# Patient Record
Sex: FEMALE | Race: BLACK OR AFRICAN AMERICAN | NOT HISPANIC OR LATINO
[De-identification: names, ages, dates, MRNs, and addresses within clinical notes are randomized per-mention and may not be internally consistent; named-entity substitution may affect disease eponyms.]

---

## 2017-02-27 ENCOUNTER — RECORD ABSTRACTING (OUTPATIENT)
Age: 37
End: 2017-02-27

## 2017-02-27 DIAGNOSIS — Z87.09 PERSONAL HISTORY OF OTHER DISEASES OF THE RESPIRATORY SYSTEM: ICD-10-CM

## 2017-02-27 DIAGNOSIS — Z78.9 OTHER SPECIFIED HEALTH STATUS: ICD-10-CM

## 2017-02-27 DIAGNOSIS — Z82.49 FAMILY HISTORY OF ISCHEMIC HEART DISEASE AND OTHER DISEASES OF THE CIRCULATORY SYSTEM: ICD-10-CM

## 2017-02-27 DIAGNOSIS — G40.909 EPILEPSY, UNSPECIFIED, NOT INTRACTABLE, W/OUT STATUS EPILEPTICUS: ICD-10-CM

## 2017-02-27 DIAGNOSIS — Z98.890 OTHER SPECIFIED POSTPROCEDURAL STATES: ICD-10-CM

## 2017-02-27 DIAGNOSIS — O02.1 MISSED ABORTION: ICD-10-CM

## 2017-02-27 DIAGNOSIS — Z87.898 PERSONAL HISTORY OF OTHER SPECIFIED CONDITIONS: ICD-10-CM

## 2017-02-27 DIAGNOSIS — Z87.59 PERSONAL HISTORY OF OTHER COMPLICATIONS OF PREGNANCY, CHILDBIRTH AND THE PUERPERIUM: ICD-10-CM

## 2017-02-27 PROBLEM — Z00.00 ENCOUNTER FOR PREVENTIVE HEALTH EXAMINATION: Status: ACTIVE | Noted: 2017-02-27

## 2017-02-27 RX ORDER — ALBUTEROL 90 MCG
AEROSOL (GRAM) INHALATION
Refills: 0 | Status: ACTIVE | COMMUNITY

## 2018-07-23 PROBLEM — Z78.9 ALCOHOL USE: Status: ACTIVE | Noted: 2017-02-27

## 2019-03-12 ENCOUNTER — OUTPATIENT (OUTPATIENT)
Dept: OUTPATIENT SERVICES | Facility: HOSPITAL | Age: 39
LOS: 1 days | Discharge: HOME | End: 2019-03-12

## 2019-03-12 DIAGNOSIS — F33.1 MAJOR DEPRESSIVE DISORDER, RECURRENT, MODERATE: ICD-10-CM

## 2019-04-17 ENCOUNTER — EMERGENCY (EMERGENCY)
Facility: HOSPITAL | Age: 39
LOS: 0 days | Discharge: HOME | End: 2019-04-18
Attending: EMERGENCY MEDICINE | Admitting: EMERGENCY MEDICINE
Payer: MEDICAID

## 2019-04-17 VITALS
DIASTOLIC BLOOD PRESSURE: 57 MMHG | TEMPERATURE: 98 F | HEART RATE: 57 BPM | RESPIRATION RATE: 18 BRPM | OXYGEN SATURATION: 99 % | SYSTOLIC BLOOD PRESSURE: 128 MMHG

## 2019-04-17 DIAGNOSIS — J45.909 UNSPECIFIED ASTHMA, UNCOMPLICATED: ICD-10-CM

## 2019-04-17 DIAGNOSIS — R10.2 PELVIC AND PERINEAL PAIN: ICD-10-CM

## 2019-04-17 DIAGNOSIS — R06.2 WHEEZING: ICD-10-CM

## 2019-04-17 DIAGNOSIS — Z88.8 ALLERGY STATUS TO OTHER DRUGS, MEDICAMENTS AND BIOLOGICAL SUBSTANCES: ICD-10-CM

## 2019-04-17 LAB
ALBUMIN SERPL ELPH-MCNC: 4.3 G/DL — SIGNIFICANT CHANGE UP (ref 3.5–5.2)
ALP SERPL-CCNC: 72 U/L — SIGNIFICANT CHANGE UP (ref 30–115)
ALT FLD-CCNC: 10 U/L — SIGNIFICANT CHANGE UP (ref 0–41)
ANION GAP SERPL CALC-SCNC: 14 MMOL/L — SIGNIFICANT CHANGE UP (ref 7–14)
APPEARANCE UR: CLEAR — SIGNIFICANT CHANGE UP
AST SERPL-CCNC: 15 U/L — SIGNIFICANT CHANGE UP (ref 0–41)
BASOPHILS # BLD AUTO: 0.05 K/UL — SIGNIFICANT CHANGE UP (ref 0–0.2)
BASOPHILS NFR BLD AUTO: 0.6 % — SIGNIFICANT CHANGE UP (ref 0–1)
BILIRUB SERPL-MCNC: 0.2 MG/DL — SIGNIFICANT CHANGE UP (ref 0.2–1.2)
BILIRUB UR-MCNC: NEGATIVE — SIGNIFICANT CHANGE UP
BUN SERPL-MCNC: 11 MG/DL — SIGNIFICANT CHANGE UP (ref 10–20)
CALCIUM SERPL-MCNC: 9.5 MG/DL — SIGNIFICANT CHANGE UP (ref 8.5–10.1)
CHLORIDE SERPL-SCNC: 106 MMOL/L — SIGNIFICANT CHANGE UP (ref 98–110)
CO2 SERPL-SCNC: 25 MMOL/L — SIGNIFICANT CHANGE UP (ref 17–32)
COLOR SPEC: YELLOW — SIGNIFICANT CHANGE UP
CREAT SERPL-MCNC: 0.9 MG/DL — SIGNIFICANT CHANGE UP (ref 0.7–1.5)
DIFF PNL FLD: NEGATIVE — SIGNIFICANT CHANGE UP
EOSINOPHIL # BLD AUTO: 0.37 K/UL — SIGNIFICANT CHANGE UP (ref 0–0.7)
EOSINOPHIL NFR BLD AUTO: 4.4 % — SIGNIFICANT CHANGE UP (ref 0–8)
GLUCOSE SERPL-MCNC: 109 MG/DL — HIGH (ref 70–99)
GLUCOSE UR QL: NEGATIVE MG/DL — SIGNIFICANT CHANGE UP
HCG SERPL-ACNC: <0.6 MIU/ML — SIGNIFICANT CHANGE UP
HCT VFR BLD CALC: 36 % — LOW (ref 37–47)
HGB BLD-MCNC: 10.6 G/DL — LOW (ref 12–16)
IMM GRANULOCYTES NFR BLD AUTO: 0.2 % — SIGNIFICANT CHANGE UP (ref 0.1–0.3)
KETONES UR-MCNC: NEGATIVE — SIGNIFICANT CHANGE UP
LACTATE SERPL-SCNC: 2.1 MMOL/L — SIGNIFICANT CHANGE UP (ref 0.5–2.2)
LEUKOCYTE ESTERASE UR-ACNC: NEGATIVE — SIGNIFICANT CHANGE UP
LIDOCAIN IGE QN: 40 U/L — SIGNIFICANT CHANGE UP (ref 7–60)
LYMPHOCYTES # BLD AUTO: 2.13 K/UL — SIGNIFICANT CHANGE UP (ref 1.2–3.4)
LYMPHOCYTES # BLD AUTO: 25.1 % — SIGNIFICANT CHANGE UP (ref 20.5–51.1)
MCHC RBC-ENTMCNC: 25.8 PG — LOW (ref 27–31)
MCHC RBC-ENTMCNC: 29.4 G/DL — LOW (ref 32–37)
MCV RBC AUTO: 87.6 FL — SIGNIFICANT CHANGE UP (ref 81–99)
MONOCYTES # BLD AUTO: 0.63 K/UL — HIGH (ref 0.1–0.6)
MONOCYTES NFR BLD AUTO: 7.4 % — SIGNIFICANT CHANGE UP (ref 1.7–9.3)
NEUTROPHILS # BLD AUTO: 5.27 K/UL — SIGNIFICANT CHANGE UP (ref 1.4–6.5)
NEUTROPHILS NFR BLD AUTO: 62.3 % — SIGNIFICANT CHANGE UP (ref 42.2–75.2)
NITRITE UR-MCNC: NEGATIVE — SIGNIFICANT CHANGE UP
NRBC # BLD: 0 /100 WBCS — SIGNIFICANT CHANGE UP (ref 0–0)
PH UR: 7.5 — SIGNIFICANT CHANGE UP (ref 5–8)
PLATELET # BLD AUTO: 326 K/UL — SIGNIFICANT CHANGE UP (ref 130–400)
POTASSIUM SERPL-MCNC: 4.2 MMOL/L — SIGNIFICANT CHANGE UP (ref 3.5–5)
POTASSIUM SERPL-SCNC: 4.2 MMOL/L — SIGNIFICANT CHANGE UP (ref 3.5–5)
PROT SERPL-MCNC: 6.9 G/DL — SIGNIFICANT CHANGE UP (ref 6–8)
PROT UR-MCNC: NEGATIVE MG/DL — SIGNIFICANT CHANGE UP
RBC # BLD: 4.11 M/UL — LOW (ref 4.2–5.4)
RBC # FLD: 14.5 % — SIGNIFICANT CHANGE UP (ref 11.5–14.5)
SODIUM SERPL-SCNC: 145 MMOL/L — SIGNIFICANT CHANGE UP (ref 135–146)
SP GR SPEC: 1.02 — SIGNIFICANT CHANGE UP (ref 1.01–1.03)
UROBILINOGEN FLD QL: 1 MG/DL (ref 0.2–0.2)
WBC # BLD: 8.47 K/UL — SIGNIFICANT CHANGE UP (ref 4.8–10.8)
WBC # FLD AUTO: 8.47 K/UL — SIGNIFICANT CHANGE UP (ref 4.8–10.8)

## 2019-04-17 PROCEDURE — 99285 EMERGENCY DEPT VISIT HI MDM: CPT

## 2019-04-17 PROCEDURE — 71046 X-RAY EXAM CHEST 2 VIEWS: CPT | Mod: 26

## 2019-04-17 RX ORDER — IPRATROPIUM/ALBUTEROL SULFATE 18-103MCG
3 AEROSOL WITH ADAPTER (GRAM) INHALATION ONCE
Qty: 0 | Refills: 0 | Status: COMPLETED | OUTPATIENT
Start: 2019-04-17 | End: 2019-04-17

## 2019-04-17 RX ORDER — SODIUM CHLORIDE 9 MG/ML
1000 INJECTION INTRAMUSCULAR; INTRAVENOUS; SUBCUTANEOUS ONCE
Qty: 0 | Refills: 0 | Status: COMPLETED | OUTPATIENT
Start: 2019-04-17 | End: 2019-04-17

## 2019-04-17 RX ADMIN — SODIUM CHLORIDE 1000 MILLILITER(S): 9 INJECTION INTRAMUSCULAR; INTRAVENOUS; SUBCUTANEOUS at 23:20

## 2019-04-17 RX ADMIN — Medication 125 MILLIGRAM(S): at 22:20

## 2019-04-17 RX ADMIN — ONDANSETRON 4 MILLIGRAM(S): 8 TABLET, FILM COATED ORAL at 23:30

## 2019-04-17 RX ADMIN — Medication 3 MILLILITER(S): at 22:20

## 2019-04-17 RX ADMIN — SODIUM CHLORIDE 2000 MILLILITER(S): 9 INJECTION INTRAMUSCULAR; INTRAVENOUS; SUBCUTANEOUS at 22:20

## 2019-04-18 VITALS
HEART RATE: 73 BPM | OXYGEN SATURATION: 96 % | RESPIRATION RATE: 18 BRPM | TEMPERATURE: 98 F | DIASTOLIC BLOOD PRESSURE: 77 MMHG | SYSTOLIC BLOOD PRESSURE: 161 MMHG

## 2019-04-18 LAB — D DIMER BLD IA.RAPID-MCNC: 243 NG/ML DDU — HIGH (ref 0–230)

## 2019-04-18 PROCEDURE — 71275 CT ANGIOGRAPHY CHEST: CPT | Mod: 26

## 2019-04-18 PROCEDURE — 76830 TRANSVAGINAL US NON-OB: CPT | Mod: 26

## 2019-04-18 RX ORDER — ACETAMINOPHEN 500 MG
650 TABLET ORAL ONCE
Qty: 0 | Refills: 0 | Status: COMPLETED | OUTPATIENT
Start: 2019-04-18 | End: 2019-04-18

## 2019-04-18 RX ORDER — ALBUTEROL 90 UG/1
3 AEROSOL, METERED ORAL
Qty: 60 | Refills: 0
Start: 2019-04-18 | End: 2019-05-02

## 2019-04-18 RX ORDER — ALBUTEROL 90 UG/1
1 AEROSOL, METERED ORAL ONCE
Qty: 0 | Refills: 0 | Status: COMPLETED | OUTPATIENT
Start: 2019-04-18 | End: 2019-04-18

## 2019-04-18 RX ORDER — ONDANSETRON 8 MG/1
4 TABLET, FILM COATED ORAL ONCE
Qty: 0 | Refills: 0 | Status: COMPLETED | OUTPATIENT
Start: 2019-04-18 | End: 2019-04-17

## 2019-04-18 RX ORDER — ALBUTEROL 90 UG/1
2.5 AEROSOL, METERED ORAL
Qty: 0 | Refills: 0 | Status: COMPLETED | OUTPATIENT
Start: 2019-04-18 | End: 2019-04-18

## 2019-04-18 RX ORDER — SODIUM CHLORIDE 9 MG/ML
1000 INJECTION, SOLUTION INTRAVENOUS ONCE
Qty: 0 | Refills: 0 | Status: COMPLETED | OUTPATIENT
Start: 2019-04-18 | End: 2019-04-18

## 2019-04-18 RX ADMIN — ALBUTEROL 1 PUFF(S): 90 AEROSOL, METERED ORAL at 05:39

## 2019-04-18 RX ADMIN — SODIUM CHLORIDE 1000 MILLILITER(S): 9 INJECTION, SOLUTION INTRAVENOUS at 00:54

## 2019-04-18 RX ADMIN — ALBUTEROL 2.5 MILLIGRAM(S): 90 AEROSOL, METERED ORAL at 00:55

## 2019-04-18 RX ADMIN — ALBUTEROL 2.5 MILLIGRAM(S): 90 AEROSOL, METERED ORAL at 01:09

## 2019-04-18 RX ADMIN — SODIUM CHLORIDE 1000 MILLILITER(S): 9 INJECTION, SOLUTION INTRAVENOUS at 01:54

## 2019-04-18 RX ADMIN — ALBUTEROL 2.5 MILLIGRAM(S): 90 AEROSOL, METERED ORAL at 00:54

## 2019-04-18 RX ADMIN — Medication 650 MILLIGRAM(S): at 05:39

## 2019-04-18 NOTE — ED PROVIDER NOTE - CLINICAL SUMMARY MEDICAL DECISION MAKING FREE TEXT BOX
Pt with complaints of wheezing, not controlled by home meds and also lower pelvic pain. Labs wnl except mildly elevated d dimer, UA normal, CXR wnl. CT PE done w/o acute findings. Pt to be d/soraya with outpt pulmonary and med clinic f/up. Given anticipatory guidance and f/up instructions.

## 2019-04-18 NOTE — ED PROVIDER NOTE - CARE PROVIDER_API CALL
Conor Limon)  Critical Care Medicine; Internal Medicine; Pulmonary Disease; Sleep Medicine  [address] [place] [address]  Phone: [phone]  Fax: [phone]  Follow Up Time:

## 2019-04-18 NOTE — ED PROVIDER NOTE - OBJECTIVE STATEMENT
38 yr F with complaints of several days of worsening lower abd pain. No f/c, no dysuria/hematuria. Pt also complaining of 1 week of wheezing. Pt with hx of asthma and symptoms are not alleviated by her home albuterol tx.

## 2019-04-18 NOTE — ED PROVIDER NOTE - NS ED ROS FT
Review of Systems    Constitutional: (-) fever  Cardiovascular: (-) chest pain, (-) syncope  Respiratory: As per HPI  Gastrointestinal: (-) vomiting, (-) diarrhea, (+) abdominal pain  Musculoskeletal: (-) neck pain, (-) back pain, (-) joint pain  Integumentary: (-) rash, (-) edema  Neurological: (-) headache, (-) altered mental status    Except as documented in the HPI, all other systems are negative.

## 2019-04-18 NOTE — ED PROVIDER NOTE - PHYSICAL EXAMINATION
CONSTITUTIONAL: Well-developed; well-nourished; in no acute distress, nontoxic appearing  SKIN: skin exam is warm and dry,  HEAD: Normocephalic; atraumatic.  EYES: PERRL, 3 mm bilateral, no nystagmus, EOM intact; conjunctiva and sclera clear.  ENT: MMM, no nasal congestion  NECK: Supple; non tender.+ full passive ROM in all directions. No JVD  CARD: S1, S2 normal, no murmur  RESP: + diffuses wheezes, rales or rhonchi. Good air movement bilaterally  ABD: soft; non-distended, + lower b/l abd ttp, No Rebound, No guarding, no CVAT b/l  EXT: Normal ROM. No cyanosis or edema. Dp Pulses intact.   NEURO: awake, alert, following commands, oriented, grossly unremarkable. No Focal deficits. GCS 15.   PSYCH: Cooperative, appropriate.

## 2019-04-18 NOTE — ED PROVIDER NOTE - NSFOLLOWUPCLINICS_GEN_ALL_ED_FT
[facility]  Medicine  [address], [place]   Phone: [phone]  Fax:   Follow Up Time:

## 2019-07-13 ENCOUNTER — OUTPATIENT (OUTPATIENT)
Dept: OUTPATIENT SERVICES | Facility: HOSPITAL | Age: 39
LOS: 1 days | Discharge: HOME | End: 2019-07-13

## 2019-07-15 DIAGNOSIS — J45.41 MODERATE PERSISTENT ASTHMA WITH (ACUTE) EXACERBATION: ICD-10-CM

## 2019-10-01 ENCOUNTER — APPOINTMENT (OUTPATIENT)
Dept: NEUROLOGY | Facility: CLINIC | Age: 39
End: 2019-10-01

## 2019-10-17 ENCOUNTER — APPOINTMENT (OUTPATIENT)
Dept: ENDOCRINOLOGY | Facility: CLINIC | Age: 39
End: 2019-10-17

## 2019-10-17 ENCOUNTER — OUTPATIENT (OUTPATIENT)
Dept: OUTPATIENT SERVICES | Facility: HOSPITAL | Age: 39
LOS: 1 days | Discharge: HOME | End: 2019-10-17

## 2019-10-17 VITALS
DIASTOLIC BLOOD PRESSURE: 75 MMHG | HEART RATE: 73 BPM | WEIGHT: 262 LBS | BODY MASS INDEX: 44.73 KG/M2 | HEIGHT: 64 IN | SYSTOLIC BLOOD PRESSURE: 106 MMHG

## 2019-10-17 DIAGNOSIS — D35.2 BENIGN NEOPLASM OF PITUITARY GLAND: ICD-10-CM

## 2019-10-17 DIAGNOSIS — E03.9 HYPOTHYROIDISM, UNSPECIFIED: ICD-10-CM

## 2019-10-17 NOTE — PHYSICAL EXAM
[No Acute Distress] : no acute distress [Alert] : alert [Normal Sclera/Conjunctiva] : normal sclera/conjunctiva [Well Nourished] : well nourished [Well Developed] : well developed [No Proptosis] : no proptosis [Normal Oropharynx] : the oropharynx was normal [EOMI] : extra ocular movement intact [No Thyroid Nodules] : there were no palpable thyroid nodules [Thyroid Not Enlarged] : the thyroid was not enlarged [Clear to Auscultation] : lungs were clear to auscultation bilaterally [No Accessory Muscle Use] : no accessory muscle use [No Respiratory Distress] : no respiratory distress [Normal S1, S2] : normal S1 and S2 [Normal Rate] : heart rate was normal  [Regular Rhythm] : with a regular rhythm [No Edema] : there was no peripheral edema [Pedal Pulses Normal] : the pedal pulses are present [Soft] : abdomen soft [Normal Bowel Sounds] : normal bowel sounds [Not Tender] : non-tender [Anterior Cervical Nodes] : anterior cervical nodes [Not Distended] : not distended [Post Cervical Nodes] : posterior cervical nodes [Axillary Nodes] : axillary nodes [Normal] : normal and non tender [No Spinal Tenderness] : no spinal tenderness [Spine Straight] : spine straight [Normal Strength/Tone] : muscle strength and tone were normal [Normal Gait] : normal gait [No Stigmata of Cushings Syndrome] : no stigmata of cushings syndrome [Normal Reflexes] : deep tendon reflexes were 2+ and symmetric [No Rash] : no rash [Acanthosis Nigricans] : no acanthosis nigricans [No Tremors] : no tremors [Oriented x3] : oriented to person, place, and time

## 2019-10-17 NOTE — HISTORY OF PRESENT ILLNESS
[FreeTextEntry1] : patient has had amenorrhea, and galatorrhea, for about 3-4 months. she is seing a psychiatrist for possible schizophrenia, and is taking olanzapine, and quietapine. most recent serum prolactin was about 300 ng/ ml.

## 2019-10-17 NOTE — ASSESSMENT
[FreeTextEntry1] : consider MRI of pituitary, and cabergoline, but discuss with psychiatrist before starting cabergoline.                                                                                                                                                                                                                                                                                                           [Carbohydrate Consistent Diet] : carbohydrate consistent diet [Diabetes Foot Care] : diabetes foot care [Long Term Vascular Complications] : long term vascular complications of diabetes

## 2019-10-17 NOTE — REASON FOR VISIT
[FreeTextEntry1] : [facility]. [Consultation] : a consultation visit

## 2019-10-18 LAB — TSH SERPL-ACNC: 1.23 UIU/ML

## 2019-10-21 ENCOUNTER — OTHER (OUTPATIENT)
Age: 39
End: 2019-10-21

## 2019-11-05 ENCOUNTER — APPOINTMENT (OUTPATIENT)
Dept: PULMONOLOGY | Facility: CLINIC | Age: 39
End: 2019-11-05

## 2019-12-31 ENCOUNTER — APPOINTMENT (OUTPATIENT)
Dept: NEUROLOGY | Facility: CLINIC | Age: 39
End: 2019-12-31

## 2020-02-14 ENCOUNTER — APPOINTMENT (OUTPATIENT)
Dept: PULMONOLOGY | Facility: CLINIC | Age: 40
End: 2020-02-14

## 2020-09-25 ENCOUNTER — APPOINTMENT (OUTPATIENT)
Dept: SURGERY | Facility: CLINIC | Age: 40
End: 2020-09-25

## 2020-09-30 ENCOUNTER — APPOINTMENT (OUTPATIENT)
Dept: SURGERY | Facility: CLINIC | Age: 40
End: 2020-09-30
Payer: MEDICAID

## 2020-09-30 VITALS
WEIGHT: 287 LBS | BODY MASS INDEX: 48.4 KG/M2 | TEMPERATURE: 97.5 F | SYSTOLIC BLOOD PRESSURE: 118 MMHG | HEIGHT: 64.5 IN | DIASTOLIC BLOOD PRESSURE: 78 MMHG

## 2020-09-30 PROCEDURE — 99204 OFFICE O/P NEW MOD 45 MIN: CPT

## 2020-10-07 NOTE — CONSULT LETTER
[Courtesy Letter:] : I had the pleasure of seeing your patient, [unfilled], in my office today. [Sincerely,] : Sincerely, [Please see my note below.] : Please see my note below. [Dear  ___] : Dear  [unfilled], [FreeTextEntry3] : Ely Lopez MD\par Bariatric & Minimally Invasive Surgery\par [facility]\par [phone]

## 2020-10-07 NOTE — ASSESSMENT
[FreeTextEntry1] : 40yo female with history of asthma and morbid obesity BMI 48 presenting for weight loss surgery.\par -discussed surgical options - gastric band, sleeve gastrectomy, nathaniel-en-Y gastric bypass\par -discussed potential surgical complications for each option - including bleeding, infection, pain, hernia, leak, stricture, and blood clots\par -discussed smoking of any kind (cigarettes, marijuana, e-cigarettes) is prohibited\par -discussed that pregnancy within 2 years is not recommended - patient has 2 children (19yo daughter, 7yo son), with no plans for more children\par -at this time, the patient is interested in SLEEVE GASTRECTOMY\par -I informed her that there may be findings on EGD that disqualify him from sleeve, particularly lacy's esophagus.\par -upon chart review, diagnosis of prolactinoma was noted - will follow up with endocrinologist\par -upon chart review, there is mention of questionable schizophrenia but no official diagnosis or treatment stated - will recommend 2 psych eval\par -recommend high protein, low carb, low fat diet with protein shakes\par -encourage exercise regimen - starting with 10 minutes per day combining cardio and resistance training with the goal of 30 minutes of exercise 4 times per week\par -next step - bariatric education session at nearest convenience \par -pre-operative preparation - will include PCP evaluation, cardiac evaluation, pulmonary evaluation, EGD, nutritional evaluation (3 months), labs and US, 2 psych eval, endocrinology\par

## 2020-10-07 NOTE — HISTORY OF PRESENT ILLNESS
[de-identified] : 40yo female with PMHx of asthma and morbid obesity BMI 48.5 presenting for consultation of weight loss surgery/management. Patient states she has struggled with her weight for several years and tends to starve for several days and then binge on bad foods.Previous weight loss attempts include various diets and exercise regimens with limited success. Patient reports asthma with PRN inhaler, no ALVA or shortness of breath at rest. She states that she has occasional heartburn but does not take medications for it. No prior endoscopy, no prior colonoscopy.\par

## 2020-10-07 NOTE — PHYSICAL EXAM
[Obese, well nourished, in no acute distress] : obese, well nourished, in no acute distress [Normal] : affect appropriate [de-identified] : mcburney scar [de-identified] : no CVA tenderness B/L

## 2020-10-14 ENCOUNTER — APPOINTMENT (OUTPATIENT)
Dept: SURGERY | Facility: CLINIC | Age: 40
End: 2020-10-14

## 2020-10-21 ENCOUNTER — APPOINTMENT (OUTPATIENT)
Dept: SURGERY | Facility: CLINIC | Age: 40
End: 2020-10-21

## 2020-10-22 ENCOUNTER — EMERGENCY (EMERGENCY)
Facility: HOSPITAL | Age: 40
LOS: 0 days | Discharge: HOME | End: 2020-10-22
Attending: EMERGENCY MEDICINE | Admitting: EMERGENCY MEDICINE
Payer: MEDICAID

## 2020-10-22 VITALS
HEART RATE: 95 BPM | DIASTOLIC BLOOD PRESSURE: 74 MMHG | TEMPERATURE: 99 F | OXYGEN SATURATION: 100 % | SYSTOLIC BLOOD PRESSURE: 139 MMHG | RESPIRATION RATE: 18 BRPM

## 2020-10-22 DIAGNOSIS — F41.1 GENERALIZED ANXIETY DISORDER: ICD-10-CM

## 2020-10-22 DIAGNOSIS — Z76.0 ENCOUNTER FOR ISSUE OF REPEAT PRESCRIPTION: ICD-10-CM

## 2020-10-22 DIAGNOSIS — F43.10 POST-TRAUMATIC STRESS DISORDER, UNSPECIFIED: ICD-10-CM

## 2020-10-22 DIAGNOSIS — Z88.8 ALLERGY STATUS TO OTHER DRUGS, MEDICAMENTS AND BIOLOGICAL SUBSTANCES: ICD-10-CM

## 2020-10-22 DIAGNOSIS — J45.909 UNSPECIFIED ASTHMA, UNCOMPLICATED: ICD-10-CM

## 2020-10-22 DIAGNOSIS — F20.9 SCHIZOPHRENIA, UNSPECIFIED: ICD-10-CM

## 2020-10-22 DIAGNOSIS — Z87.891 PERSONAL HISTORY OF NICOTINE DEPENDENCE: ICD-10-CM

## 2020-10-22 PROCEDURE — 90792 PSYCH DIAG EVAL W/MED SRVCS: CPT | Mod: GC

## 2020-10-22 PROCEDURE — 99284 EMERGENCY DEPT VISIT MOD MDM: CPT

## 2020-10-22 RX ORDER — OLANZAPINE 15 MG/1
1 TABLET, FILM COATED ORAL
Qty: 14 | Refills: 0
Start: 2020-10-22 | End: 2020-11-04

## 2020-10-22 RX ORDER — HYDROXYZINE HCL 10 MG
1 TABLET ORAL
Qty: 21 | Refills: 0
Start: 2020-10-22 | End: 2020-10-28

## 2020-10-22 RX ORDER — QUETIAPINE FUMARATE 200 MG/1
1 TABLET, FILM COATED ORAL
Qty: 14 | Refills: 0
Start: 2020-10-22 | End: 2020-11-04

## 2020-10-22 NOTE — ED BEHAVIORAL HEALTH ASSESSMENT NOTE - VIOLENCE PROTECTIVE FACTORS:
Relationship stability/Insight into violence risk and need for management/treatment/Residential stability/Sobriety

## 2020-10-22 NOTE — ED ADULT TRIAGE NOTE - CHIEF COMPLAINT QUOTE
pt requested to be sent in for psych eval by her , hasn't had medications in 1 month. reports hearing voices, insomnia. denies any SI/HI. denies any alcohol or drug use.
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
## 2020-10-22 NOTE — ED BEHAVIORAL HEALTH ASSESSMENT NOTE - CASE SUMMARY
Ms Lazaro is a 39 year old woman with a history of Schizophrenia who presented to the ED for medication refills.   It appears that patient’s case has been closed by the psychiatry [facility] that she used to follow up with, possibly as a result of noncompliance with medication and services provided and poor therapeutic alliance between the patient and staff. Patient has not taken her psychotropic medication in about 1 month and seems to have some irritability, poor sleep and worsening anxiety.   Although patient appears  irritable, she does not appear acutely psychotic or baldo and does not appear to have symptoms suggestive of a decompensation of major depression. She denies having current suicidal ideations, intent or plan.   At this time, patient is not considered an imminent danger to herself or others and does need inpatient psychiatric hospitalization. Since her case has been closed at the psychiatric outpatient program at [facility], patient will benefit from another referral for outpatient psychiatric and psychotherapy follow up. Patient was offered the services at the partial Treatment program, [facility] psychiatry [facility] and was amenable to following up there for her outpatient treatment. Patient was informed that the referral will be made by the CL team and the staff of the partial program will contact her as soon as possible. Patient verbalized understanding and was agreeable to the plan. In the meantime, patient will benefit from re-starting her psychotropic medication to prevent a decompensation of her psychiatric illness. She can continue Seroquel 50mg P.o bedtime as formerly prescribed however we recommend Zyprexa 10mg Q daily instead of 20mg since patient has not received this medication in about 1 month. Patient will also benefit from vistaril 50mg p.o Q 8 hour PRN for anxiety and insomnia.

## 2020-10-22 NOTE — ED BEHAVIORAL HEALTH ASSESSMENT NOTE - PAST PSYCHOTROPIC MEDICATION
Patient reports being on Prozac in assisted and not liking how it made her feel; she was on an unknown BARNEY which caused galactorrhea. Patient did not recall names of other previous medications

## 2020-10-22 NOTE — ED PROVIDER NOTE - PROGRESS NOTE DETAILS
PP: Spoke to psychiatry, will come and see the patient. Endorsed to Dr Reyes to follow up psych consult and dispo. The patient was evaluated by psychiatry service and cleared for d/c home., she does not meet criteria for inpatient hospitalization.  Meds for 2 weeks were sent to her pharmacy as d/w Dr Talley.  Patient is amenable with following up with partial hospitalization program at [address] [place] [address].

## 2020-10-22 NOTE — ED PROVIDER NOTE - NSFOLLOWUPINSTRUCTIONS_ED_ALL_ED_FT
FOLLOW UP IN   Partial Hospitalization Program [facility] Psychiatric [facility]  [address]  [phone] FOLLOW UP IN   Partial Hospitalization Program [facility]  [address] [phone]      Wellness Visit for Adults    WHAT YOU NEED TO KNOW:    What is a wellness visit? A wellness visit is when you see your healthcare provider to get screened for health problems. Your healthcare provider will also give you advice on how to stay healthy. Write down your questions so you remember to ask them. Ask your healthcare provider how often you should have a wellness visit.    What happens at a wellness visit? Your healthcare provider will ask about your health, and your family history of health problems. This includes high blood pressure, heart disease, and cancer. He or she will ask if you have symptoms that concern you, if you smoke, and about your mood. You may also be asked about your intake of medicines, supplements, food, and alcohol. Any of the following may be done:  •Your weight will be checked. Your height may also be checked so your body mass index (BMI) can be calculated. Your BMI shows if you are at a healthy weight.      •Your blood pressure and heart rate will be checked. Your temperature may also be checked.      •Blood and urine tests may be done. Blood tests may be done to check your cholesterol levels. Abnormal cholesterol levels increase your risk for heart disease and stroke. You may also need a blood or urine test to check for diabetes if you are at increased risk. Urine tests may be done to look for signs of an infection or kidney disease.      •A physical exam includes checking your heartbeat and lungs with a stethoscope. Your healthcare provider may also check your skin to look for sun damage.      •Screening tests may be recommended. A screening test is done to check for diseases that may not cause symptoms. The screening tests you may need depend on your age, gender, family history, and lifestyle habits. For example, colorectal screening may be recommended if you are 50 years old or older.      What screening tests do I need if I am a woman?   •A Pap smear is used to screen for cervical cancer. Pap smears are usually done every 3 to 5 years depending on your age. You may need them more often if you have had abnormal Pap smear test results in the past. Ask your healthcare provider how often you should have a Pap smear.      •A mammogram is an x-ray of your breasts to screen for breast cancer. Experts recommend mammograms every 2 years starting at age 50 years. You may need a mammogram at age 49 years or younger if you have an increased risk for breast cancer. Talk to your healthcare provider about when you should start having mammograms and how often you need them.      What vaccines might I need?   •Get an influenza vaccine every year. The influenza vaccine protects you from the flu. Several types of viruses cause the flu. The viruses change over time, so new vaccines are made each year.      •Get a tetanus-diphtheria (Td) booster vaccine every 10 years. This vaccine protects you against tetanus and diphtheria. Tetanus is a severe infection that may cause painful muscle spasms and lockjaw. Diphtheria is a severe bacterial infection that causes a thick covering in the back of your mouth and throat.      •Get a human papillomavirus (HPV) vaccine if you are female and aged 19 to 26 or male 19 to 21 and never received it. This vaccine protects you from HPV infection. HPV is the most common infection spread by sexual contact. HPV may also cause vaginal, penile, and anal cancers.      •Get a pneumococcal vaccine if you are aged 65 years or older. The pneumococcal vaccine is an injection given to protect you from pneumococcal disease. Pneumococcal disease is an infection caused by pneumococcal bacteria. The infection may cause pneumonia, meningitis, or an ear infection.      •Get a shingles vaccine if you are 60 or older, even if you have had shingles before. The shingles vaccine is an injection to protect you from the varicella-zoster virus. This is the same virus that causes chickenpox. Shingles is a painful rash that develops in people who had chickenpox or have been exposed to the virus.      How can I eat healthy? My Plate is a model for planning healthy meals. It shows the types and amounts of foods that should go on your plate. Fruits and vegetables make up about half of your plate, and grains and protein make up the other half. A serving of dairy is included on the side of your plate. The amount of calories and serving sizes you need depends on your age, gender, weight, and height. Examples of healthy foods are listed below:  •Eat a variety of vegetables such as dark green, red, and orange vegetables. You can also include canned vegetables low in sodium (salt) and frozen vegetables without added butter or sauces.      •Eat a variety of fresh fruits, canned fruit in 100% juice, frozen fruit, and dried fruit.      •Include whole grains. At least half of the grains you eat should be whole grains. Examples include whole-wheat bread, wheat pasta, brown rice, and whole-grain cereals such as oatmeal.      •Eat a variety of protein foods such as seafood (fish and shellfish), lean meat, and poultry without skin (turkey and chicken). Examples of lean meats include pork leg, shoulder, or tenderloin, and beef round, sirloin, tenderloin, and extra lean ground beef. Other protein foods include eggs and egg substitutes, beans, peas, soy products, nuts, and seeds.      •Choose low-fat dairy products such as skim or 1% milk or low-fat yogurt, cheese, and cottage cheese.      •Limit unhealthy fats such as butter, hard margarine, and shortening.    ChooseMyPlate         How much exercise do I need? Exercise at least 30 minutes per day on most days of the week. Some examples of exercise include walking, biking, dancing, and swimming. You can also fit in more physical activity by taking the stairs instead of the elevator or parking farther away from stores. Include muscle strengthening activities 2 days each week. Regular exercise provides many health benefits. It helps you manage your weight, and decreases your risk for type 2 diabetes, heart disease, stroke, and high blood pressure. Exercise can also help improve your mood. Ask your healthcare provider about the best exercise plan for you.    Walking for Exercise         What are some general health and safety guidelines I should follow?   •Do not smoke. Nicotine and other chemicals in cigarettes and cigars can cause lung damage. Ask your healthcare provider for information if you currently smoke and need help to quit. E-cigarettes or smokeless tobacco still contain nicotine. Talk to your healthcare provider before you use these products.      •Limit alcohol. A drink of alcohol is 12 ounces of beer, 5 ounces of wine, or 1½ ounces of liquor.      •Lose weight, if needed. Being overweight increases your risk of certain health conditions. These include heart disease, high blood pressure, type 2 diabetes, and certain types of cancer.      •Protect your skin. Do not sunbathe or use tanning beds. Use sunscreen with a SPF 15 or higher. Apply sunscreen at least 15 minutes before you go outside. Reapply sunscreen every 2 hours. Wear protective clothing, hats, and sunglasses when you are outside.      •Drive safely. Always wear your seatbelt. Make sure everyone in your car wears a seatbelt. A seatbelt can save your life if you are in an accident. Do not use your cell phone when you are driving. This could distract you and cause an accident. Pull over if you need to make a call or send a text message.      •Practice safe sex. Use latex condoms if are sexually active and have more than one partner. Your healthcare provider may recommend screening tests for sexually transmitted infections (STIs).      •Wear helmets, lifejackets, and protective gear. Always wear a helmet when you ride a bike or motorcycle, go skiing, or play sports that could cause a head injury. Wear protective equipment when you play sports. Wear a lifejacket when you are on a boat or doing water sports.      CARE AGREEMENT:    You have the right to help plan your care. Learn about your health condition and how it may be treated. Discuss treatment options with your healthcare providers to decide what care you want to receive. You always have the right to refuse treatment.

## 2020-10-22 NOTE — ED BEHAVIORAL HEALTH ASSESSMENT NOTE - REFERRAL / APPOINTMENT DETAILS
[facility], [address] [id number] [phone], ask for Courtney Rojas
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
## 2020-10-22 NOTE — ED BEHAVIORAL HEALTH ASSESSMENT NOTE - VIOLENCE RISK FACTORS:
Antisocial behavior/cognition (past or present)/Impulsivity/History of being victimized/traumatized/History of violation of a legal mandate (e.g., parole, probation, AOT)

## 2020-10-22 NOTE — ED PROVIDER NOTE - CLINICAL SUMMARY MEDICAL DECISION MAKING FREE TEXT BOX
The patient was evaluated by psychiatry service and cleared for d/c home., she does not meet criteria for inpatient hospitalization.  Meds for 2 weeks were sent to her pharmacy as d/w Dr Talley.  Patient is amenable with following up with partial hospitalization program at [address] [place] [address].

## 2020-10-22 NOTE — ED BEHAVIORAL HEALTH ASSESSMENT NOTE - SUMMARY
39-year-old female with a reported past psychiatric history of Schizophrenia, PTSD, MAHAD, presented to the ED brought in by self with the chief complaint "I need my psychiatric medications". Psychiatry was consulted for the chief complaint and evaluation of the patient's mental health.     On evaluation, the patient denies suicidal ideation, homicidal ideation, or violent ideation. She exhibits no signed of responding to internal stimuli and denied psychotic symptoms. She endorses increased anxiety and paranoia since running out of medications October 1, as well as decreased sleep (again, secondary to lack of Seroquel). Per the patient's mother, the patient has been doing very well in spite of missing medications, and has no safety concerns with the patient returning home. The patient is in regular contact with her  as well. The patient's current condition does not meet criteria for inpatient psychiatric treatment as she is not a threat to herself or others at this time, and per mother is able to care for herself and her children. The patient is motivated for outpatient treatment and agreeable to attending [facility]'s partial hospitalization program. Psychiatry recommends discharging the patient with a 14-day supply of medications to cover the patient under partial hospitalization follow up    Psychiatry recommends:    #Schizophrenia   - Restart the patient on Olanzapine 10 mg PO Daily    #Insomnia  - Restart the patient on Seroquel 50 mg PO Nightly    #MAHAD  - Restart the patient on Vistaril 25 mg PO Q8H PRN for severe anxiety

## 2020-10-22 NOTE — ED BEHAVIORAL HEALTH ASSESSMENT NOTE - SUICIDE PROTECTIVE FACTORS
Identifies reasons for living/Has future plans/Positive therapeutic relationships/Responsibility to family and others
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
## 2020-10-22 NOTE — ED PROVIDER NOTE - PATIENT PORTAL LINK FT
You can access the FollowMyHealth Patient Portal offered by [facility] by registering at the following website: [email or web address]. By joining [employer]’s FollowMyHealth portal, you will also be able to view your health information using other applications (apps) compatible with our system.

## 2020-10-22 NOTE — ED BEHAVIORAL HEALTH ASSESSMENT NOTE - ACTIVATING EVENTS/STRESSORS
Legal problems/Non-compliant or not receiving treatment/Change in provider or treatment (i.e., medications, psychotherapy, milieu)

## 2020-10-22 NOTE — ED PROVIDER NOTE - ATTENDING CONTRIBUTION TO CARE
40 yo female PMH as noted her to be evaluated by a psychiatrist , she has been of her meds for a while, cannot get refills.  Denies any acute complaints, no SI/HI.  Well-appearing female, NAD, exam as noted, she is pleasant and cooperative.  Will get psychiatry consult, dispo accordingly.

## 2020-10-22 NOTE — ED PROVIDER NOTE - PHYSICAL EXAMINATION
VITALS: Reviewed  CONSTITUTIONAL: well developed, well nourished, in no acute distress, speaking in full sentences, nontoxic appearing  SKIN: warm, dry, no rash  HEAD: normocephalic, atraumatic  EYES: PERRL, EOMI, no conjunctival erythema, sclera clear  ENT: patent airway, moist mucous membranes  NECK: supple, no masses  CV:  regular rate, regular rhythm, 2+ radial pulses bilaterally  RESP: no wheezes, no rales, no rhonchi, normal work of breathing  ABD: soft, nontender, nondistended, no rebound, no guarding  MSK: normal ROM, no cyanosis, no edema  NEURO: alert, oriented x3  PSYCH: cooperative, appropriate
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
## 2020-10-22 NOTE — ED ADULT NURSE NOTE - OBJECTIVE STATEMENT
Patient requesting fozia lee patient states she has been off her meds x1 month. Patient denies SI/HI or any adverse reactions to being off medication. Patient has pmh of schizophrenia. No s/s of distress, patient A&Ox4. Denies pain or discomfort, no s/s of distress noted.

## 2020-10-22 NOTE — ED PROVIDER NOTE - NS ED ROS FT
Review of Systems:  CONSTITUTIONAL - No fever  SKIN - No rash  HEMATOLOGIC - No abnormal bleeding or bruising  RESPIRATORY - No shortness of breath, No cough  CARDIAC -No chest pain, No palpitations  GI - No abdominal pain, No nausea, No vomiting, No diarrhea  - No dysuria, frequency, hematuria.  MUSCULOSKELETAL - No joint paint, No swelling, No back pain  NEUROLOGIC - No numbness, No focal weakness, No headache  All other systems negative, unless specified in HPI

## 2020-10-22 NOTE — ED PROVIDER NOTE - OBJECTIVE STATEMENT
39Y F with PMH of Schizophrenia, PTSD presents with CC of medication refill. Patient reports having no access to medications for the last month. Follows with Dr. Maxwell for psychiatry, Dr. Berumen for PCP. Patient has tried reaching out and making an apptm but unable to and is requesting medications; was told by therapist to come to ED as patient has been non-compliant with medications. Denies any medical complaints. Denies suicidal ideation and homicidal ideation.
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
## 2020-10-26 PROBLEM — J45.909 UNSPECIFIED ASTHMA, UNCOMPLICATED: Chronic | Status: ACTIVE | Noted: 2020-10-22

## 2020-10-31 ENCOUNTER — OUTPATIENT (OUTPATIENT)
Dept: OUTPATIENT SERVICES | Facility: HOSPITAL | Age: 40
LOS: 1 days | Discharge: HOME | End: 2020-10-31

## 2020-11-03 ENCOUNTER — NON-APPOINTMENT (OUTPATIENT)
Age: 40
End: 2020-11-03

## 2020-11-04 DIAGNOSIS — E66.01 MORBID (SEVERE) OBESITY DUE TO EXCESS CALORIES: ICD-10-CM

## 2020-11-04 DIAGNOSIS — F50.9 EATING DISORDER, UNSPECIFIED: ICD-10-CM

## 2020-11-05 ENCOUNTER — APPOINTMENT (OUTPATIENT)
Dept: SURGERY | Facility: CLINIC | Age: 40
End: 2020-11-05

## 2020-11-08 ENCOUNTER — TRANSCRIPTION ENCOUNTER (OUTPATIENT)
Age: 40
End: 2020-11-08

## 2020-11-11 ENCOUNTER — APPOINTMENT (OUTPATIENT)
Dept: SURGERY | Facility: CLINIC | Age: 40
End: 2020-11-11
Payer: SELF-PAY

## 2020-11-11 PROCEDURE — SI006: CPT | Mod: NC

## 2020-11-25 ENCOUNTER — APPOINTMENT (OUTPATIENT)
Dept: SURGERY | Facility: CLINIC | Age: 40
End: 2020-11-25

## 2020-12-01 ENCOUNTER — APPOINTMENT (OUTPATIENT)
Dept: GASTROENTEROLOGY | Facility: CLINIC | Age: 40
End: 2020-12-01
Payer: MEDICAID

## 2020-12-01 ENCOUNTER — OUTPATIENT (OUTPATIENT)
Dept: OUTPATIENT SERVICES | Facility: HOSPITAL | Age: 40
LOS: 1 days | Discharge: HOME | End: 2020-12-01

## 2020-12-01 VITALS
TEMPERATURE: 97.6 F | HEIGHT: 64.5 IN | HEART RATE: 89 BPM | WEIGHT: 272 LBS | SYSTOLIC BLOOD PRESSURE: 126 MMHG | BODY MASS INDEX: 45.87 KG/M2 | DIASTOLIC BLOOD PRESSURE: 86 MMHG

## 2020-12-01 DIAGNOSIS — D50.9 IRON DEFICIENCY ANEMIA, UNSPECIFIED: ICD-10-CM

## 2020-12-01 PROCEDURE — 99242 OFF/OP CONSLTJ NEW/EST SF 20: CPT

## 2020-12-01 NOTE — PHYSICAL EXAM
[General Appearance - Alert] : alert [Sclera] : the sclera and conjunctiva were normal [Outer Ear] : the ears and nose were normal in appearance [Neck Appearance] : the appearance of the neck was normal [Abdomen Soft] : soft [No CVA Tenderness] : no ~M costovertebral angle tenderness [Abnormal Walk] : normal gait [Skin Color & Pigmentation] : normal skin color and pigmentation [Cranial Nerves] : cranial nerves 2-12 were intact [Oriented To Time, Place, And Person] : oriented to person, place, and time

## 2020-12-01 NOTE — HISTORY OF PRESENT ILLNESS
[de-identified] : 39 yo AA F hx of asthma BMI>40 in a bariatric program referred for pre op evaluation.

## 2020-12-01 NOTE — ASSESSMENT
[FreeTextEntry1] : 39 yo AA F avergae risk for CRC here for pre bariatric surgery EGD hx of prolactinoma found to have KIERSTEN.\par \par 1)Morbid obesity\par 2)KIERSTEN\par 3)Asthma- asymptomatic\par \par Plan:\par EGD\par Colonoscopy\par RTC after above

## 2020-12-02 ENCOUNTER — APPOINTMENT (OUTPATIENT)
Dept: SURGERY | Facility: CLINIC | Age: 40
End: 2020-12-02

## 2020-12-02 ENCOUNTER — NON-APPOINTMENT (OUTPATIENT)
Age: 40
End: 2020-12-02

## 2020-12-04 ENCOUNTER — OUTPATIENT (OUTPATIENT)
Dept: OUTPATIENT SERVICES | Facility: HOSPITAL | Age: 40
LOS: 1 days | Discharge: HOME | End: 2020-12-04

## 2020-12-04 ENCOUNTER — LABORATORY RESULT (OUTPATIENT)
Age: 40
End: 2020-12-04

## 2020-12-04 DIAGNOSIS — Z11.59 ENCOUNTER FOR SCREENING FOR OTHER VIRAL DISEASES: ICD-10-CM

## 2020-12-07 ENCOUNTER — RESULT REVIEW (OUTPATIENT)
Age: 40
End: 2020-12-07

## 2020-12-07 ENCOUNTER — OUTPATIENT (OUTPATIENT)
Dept: OUTPATIENT SERVICES | Facility: HOSPITAL | Age: 40
LOS: 1 days | Discharge: HOME | End: 2020-12-07
Payer: MEDICAID

## 2020-12-07 ENCOUNTER — TRANSCRIPTION ENCOUNTER (OUTPATIENT)
Age: 40
End: 2020-12-07

## 2020-12-07 VITALS
OXYGEN SATURATION: 100 % | HEART RATE: 70 BPM | RESPIRATION RATE: 18 BRPM | DIASTOLIC BLOOD PRESSURE: 60 MMHG | SYSTOLIC BLOOD PRESSURE: 119 MMHG

## 2020-12-07 VITALS
TEMPERATURE: 99 F | RESPIRATION RATE: 19 BRPM | WEIGHT: 272.05 LBS | SYSTOLIC BLOOD PRESSURE: 127 MMHG | DIASTOLIC BLOOD PRESSURE: 82 MMHG | HEIGHT: 64 IN | HEART RATE: 82 BPM

## 2020-12-07 DIAGNOSIS — Z90.49 ACQUIRED ABSENCE OF OTHER SPECIFIED PARTS OF DIGESTIVE TRACT: Chronic | ICD-10-CM

## 2020-12-07 DIAGNOSIS — O03.9 COMPLETE OR UNSPECIFIED SPONTANEOUS ABORTION WITHOUT COMPLICATION: Chronic | ICD-10-CM

## 2020-12-07 PROCEDURE — 88305 TISSUE EXAM BY PATHOLOGIST: CPT | Mod: 26

## 2020-12-07 PROCEDURE — 88312 SPECIAL STAINS GROUP 1: CPT | Mod: 26

## 2020-12-07 PROCEDURE — 43239 EGD BIOPSY SINGLE/MULTIPLE: CPT | Mod: XS

## 2020-12-07 PROCEDURE — 45385 COLONOSCOPY W/LESION REMOVAL: CPT

## 2020-12-07 RX ORDER — ONDANSETRON 8 MG/1
4 TABLET, FILM COATED ORAL ONCE
Refills: 0 | Status: DISCONTINUED | OUTPATIENT
Start: 2020-12-07 | End: 2020-12-21

## 2020-12-07 RX ORDER — SODIUM CHLORIDE 9 MG/ML
1000 INJECTION, SOLUTION INTRAVENOUS
Refills: 0 | Status: DISCONTINUED | OUTPATIENT
Start: 2020-12-07 | End: 2020-12-21

## 2020-12-07 NOTE — H&P PST ADULT - ASSESSMENT
40 female is here for egd and colonoscopy for prebariatric surgery evaluation   Plan   proceed to egd and colonoscopy

## 2020-12-07 NOTE — PRE-ANESTHESIA EVALUATION ADULT - NSANTHPMHFT_GEN_ALL_CORE
Intubated at age 9 for asthma exacerbation. Now takes inhaler as needed (approximately once per week). Took inhaler this morning in preparation for procedure. denies any recent coughing, shortness of breath, or wheezing.

## 2020-12-07 NOTE — ASU DISCHARGE PLAN (ADULT/PEDIATRIC) - CALL YOUR DOCTOR IF YOU HAVE ANY OF THE FOLLOWING:
Fever greater than (need to indicate Fahrenheit or Celsius)/Nausea and vomiting that does not stop/Numbness, tingling, color or temperature change to extremity/Bleeding that does not stop/Pain not relieved by Medications/Increased irritability or sluggishness/Swelling that gets worse/Excessive diarrhea/Inability to tolerate liquids or foods/Unable to urinate

## 2020-12-07 NOTE — CHART NOTE - NSCHARTNOTEFT_GEN_A_CORE
PACU ANESTHESIA ADMISSION NOTE      Procedure:   Post op diagnosis:      ____  Intubated  TV:______       Rate: ______      FiO2: ______    __x__  Patent Airway    ___x_  Full return of protective reflexes    ___x_  Full recovery from anesthesia / back to baseline     Vitals:   T: 97.7          R:  22                BP:                  Sat:                   P:       Mental Status:  ___x_ Awake   ___x__ Alert   _____ Drowsy   _____ Sedated    Nausea/Vomiting:  __x__ NO  ______Yes,   See Post - Op Orders          Pain Scale (0-10):  _____    Treatment: __x__ None    ____ See Post - Op/PCA Orders    Post - Operative Fluids:   ____ Oral   __x__ See Post - Op Orders    Plan: Discharge:   __x__Home       _____Floor     _____Critical Care    _____  Other:_________________    Comments:    Pt is spontaneously breathing, hemodynamically stable. Phase 1 being completed in the procedure room. Please transfer when criteria are met.

## 2020-12-08 LAB
SURGICAL PATHOLOGY STUDY: SIGNIFICANT CHANGE UP
SURGICAL PATHOLOGY STUDY: SIGNIFICANT CHANGE UP

## 2020-12-10 DIAGNOSIS — Z01.818 ENCOUNTER FOR OTHER PREPROCEDURAL EXAMINATION: ICD-10-CM

## 2020-12-10 DIAGNOSIS — F17.210 NICOTINE DEPENDENCE, CIGARETTES, UNCOMPLICATED: ICD-10-CM

## 2020-12-10 DIAGNOSIS — K64.8 OTHER HEMORRHOIDS: ICD-10-CM

## 2020-12-10 DIAGNOSIS — G43.909 MIGRAINE, UNSPECIFIED, NOT INTRACTABLE, WITHOUT STATUS MIGRAINOSUS: ICD-10-CM

## 2020-12-10 DIAGNOSIS — K29.50 UNSPECIFIED CHRONIC GASTRITIS WITHOUT BLEEDING: ICD-10-CM

## 2020-12-10 DIAGNOSIS — D64.9 ANEMIA, UNSPECIFIED: ICD-10-CM

## 2020-12-10 DIAGNOSIS — Z88.1 ALLERGY STATUS TO OTHER ANTIBIOTIC AGENTS STATUS: ICD-10-CM

## 2020-12-10 DIAGNOSIS — K64.4 RESIDUAL HEMORRHOIDAL SKIN TAGS: ICD-10-CM

## 2020-12-10 DIAGNOSIS — Z90.49 ACQUIRED ABSENCE OF OTHER SPECIFIED PARTS OF DIGESTIVE TRACT: ICD-10-CM

## 2020-12-10 DIAGNOSIS — D12.5 BENIGN NEOPLASM OF SIGMOID COLON: ICD-10-CM

## 2020-12-10 DIAGNOSIS — K29.80 DUODENITIS WITHOUT BLEEDING: ICD-10-CM

## 2020-12-10 DIAGNOSIS — J45.909 UNSPECIFIED ASTHMA, UNCOMPLICATED: ICD-10-CM

## 2020-12-14 ENCOUNTER — RESULT REVIEW (OUTPATIENT)
Age: 40
End: 2020-12-14

## 2020-12-14 ENCOUNTER — OUTPATIENT (OUTPATIENT)
Dept: OUTPATIENT SERVICES | Facility: HOSPITAL | Age: 40
LOS: 1 days | Discharge: HOME | End: 2020-12-14
Payer: MEDICAID

## 2020-12-14 DIAGNOSIS — Z90.49 ACQUIRED ABSENCE OF OTHER SPECIFIED PARTS OF DIGESTIVE TRACT: Chronic | ICD-10-CM

## 2020-12-14 DIAGNOSIS — O03.9 COMPLETE OR UNSPECIFIED SPONTANEOUS ABORTION WITHOUT COMPLICATION: Chronic | ICD-10-CM

## 2020-12-14 DIAGNOSIS — E66.1 DRUG-INDUCED OBESITY: ICD-10-CM

## 2020-12-14 PROCEDURE — 76700 US EXAM ABDOM COMPLETE: CPT | Mod: 26

## 2020-12-21 ENCOUNTER — NON-APPOINTMENT (OUTPATIENT)
Age: 40
End: 2020-12-21

## 2021-01-01 ENCOUNTER — APPOINTMENT (OUTPATIENT)
Dept: SURGERY | Facility: CLINIC | Age: 41
End: 2021-01-01

## 2021-01-01 ENCOUNTER — APPOINTMENT (OUTPATIENT)
Dept: SURGERY | Facility: CLINIC | Age: 41
End: 2021-01-01
Payer: MEDICAID

## 2021-01-01 ENCOUNTER — OUTPATIENT (OUTPATIENT)
Dept: OUTPATIENT SERVICES | Facility: HOSPITAL | Age: 41
LOS: 1 days | Discharge: HOME | End: 2021-01-01

## 2021-01-01 ENCOUNTER — TRANSCRIPTION ENCOUNTER (OUTPATIENT)
Age: 41
End: 2021-01-01

## 2021-01-01 ENCOUNTER — RESULT REVIEW (OUTPATIENT)
Age: 41
End: 2021-01-01

## 2021-01-01 ENCOUNTER — APPOINTMENT (OUTPATIENT)
Dept: PULMONOLOGY | Facility: CLINIC | Age: 41
End: 2021-01-01

## 2021-01-01 ENCOUNTER — NON-APPOINTMENT (OUTPATIENT)
Age: 41
End: 2021-01-01

## 2021-01-01 ENCOUNTER — APPOINTMENT (OUTPATIENT)
Dept: CARDIOLOGY | Facility: CLINIC | Age: 41
End: 2021-01-01

## 2021-01-01 ENCOUNTER — INPATIENT (INPATIENT)
Facility: HOSPITAL | Age: 41
LOS: 1 days | Discharge: HOME HEALTH | End: 2021-06-16
Attending: STUDENT IN AN ORGANIZED HEALTH CARE EDUCATION/TRAINING PROGRAM | Admitting: STUDENT IN AN ORGANIZED HEALTH CARE EDUCATION/TRAINING PROGRAM
Payer: MEDICAID

## 2021-01-01 ENCOUNTER — LABORATORY RESULT (OUTPATIENT)
Age: 41
End: 2021-01-01

## 2021-01-01 ENCOUNTER — APPOINTMENT (OUTPATIENT)
Dept: ORTHOPEDIC SURGERY | Facility: CLINIC | Age: 41
End: 2021-01-01

## 2021-01-01 ENCOUNTER — APPOINTMENT (OUTPATIENT)
Age: 41
End: 2021-01-01

## 2021-01-01 ENCOUNTER — EMERGENCY (EMERGENCY)
Facility: HOSPITAL | Age: 41
LOS: 0 days | Discharge: HOME | End: 2021-02-15
Attending: EMERGENCY MEDICINE | Admitting: EMERGENCY MEDICINE
Payer: MEDICAID

## 2021-01-01 ENCOUNTER — OUTPATIENT (OUTPATIENT)
Dept: OUTPATIENT SERVICES | Facility: HOSPITAL | Age: 41
LOS: 1 days | Discharge: HOME | End: 2021-01-01
Payer: MEDICAID

## 2021-01-01 ENCOUNTER — APPOINTMENT (OUTPATIENT)
Dept: SURGERY | Facility: HOSPITAL | Age: 41
End: 2021-01-01

## 2021-01-01 ENCOUNTER — EMERGENCY (EMERGENCY)
Facility: HOSPITAL | Age: 41
LOS: 0 days | Discharge: HOME | End: 2021-02-10
Attending: STUDENT IN AN ORGANIZED HEALTH CARE EDUCATION/TRAINING PROGRAM | Admitting: STUDENT IN AN ORGANIZED HEALTH CARE EDUCATION/TRAINING PROGRAM
Payer: MEDICAID

## 2021-01-01 VITALS
TEMPERATURE: 99 F | DIASTOLIC BLOOD PRESSURE: 73 MMHG | HEART RATE: 94 BPM | HEIGHT: 64 IN | RESPIRATION RATE: 18 BRPM | OXYGEN SATURATION: 99 % | SYSTOLIC BLOOD PRESSURE: 130 MMHG

## 2021-01-01 VITALS — TEMPERATURE: 97.1 F | WEIGHT: 263.6 LBS | HEIGHT: 64.5 IN | BODY MASS INDEX: 44.46 KG/M2

## 2021-01-01 VITALS
TEMPERATURE: 99 F | RESPIRATION RATE: 16 BRPM | OXYGEN SATURATION: 100 % | SYSTOLIC BLOOD PRESSURE: 121 MMHG | HEART RATE: 81 BPM | WEIGHT: 270.95 LBS | DIASTOLIC BLOOD PRESSURE: 73 MMHG

## 2021-01-01 VITALS — HEIGHT: 64.5 IN | WEIGHT: 269 LBS | BODY MASS INDEX: 45.37 KG/M2

## 2021-01-01 VITALS
SYSTOLIC BLOOD PRESSURE: 110 MMHG | BODY MASS INDEX: 38.92 KG/M2 | TEMPERATURE: 96.4 F | OXYGEN SATURATION: 99 % | DIASTOLIC BLOOD PRESSURE: 88 MMHG | WEIGHT: 230.8 LBS | HEIGHT: 64.5 IN | HEART RATE: 76 BPM

## 2021-01-01 VITALS
WEIGHT: 271 LBS | TEMPERATURE: 97 F | HEIGHT: 64.5 IN | SYSTOLIC BLOOD PRESSURE: 99 MMHG | OXYGEN SATURATION: 98 % | BODY MASS INDEX: 45.7 KG/M2 | DIASTOLIC BLOOD PRESSURE: 85 MMHG | HEART RATE: 98 BPM

## 2021-01-01 VITALS
HEIGHT: 64.5 IN | OXYGEN SATURATION: 99 % | TEMPERATURE: 97.2 F | BODY MASS INDEX: 39.97 KG/M2 | DIASTOLIC BLOOD PRESSURE: 80 MMHG | SYSTOLIC BLOOD PRESSURE: 110 MMHG | WEIGHT: 237 LBS | HEART RATE: 77 BPM

## 2021-01-01 VITALS — BODY MASS INDEX: 40.95 KG/M2 | TEMPERATURE: 97.3 F | HEIGHT: 64.5 IN | WEIGHT: 242.8 LBS

## 2021-01-01 VITALS
RESPIRATION RATE: 17 BRPM | HEART RATE: 68 BPM | TEMPERATURE: 98 F | DIASTOLIC BLOOD PRESSURE: 53 MMHG | SYSTOLIC BLOOD PRESSURE: 114 MMHG

## 2021-01-01 VITALS
RESPIRATION RATE: 18 BRPM | WEIGHT: 273.81 LBS | OXYGEN SATURATION: 100 % | HEART RATE: 81 BPM | HEIGHT: 65 IN | DIASTOLIC BLOOD PRESSURE: 81 MMHG | SYSTOLIC BLOOD PRESSURE: 116 MMHG | TEMPERATURE: 98 F

## 2021-01-01 VITALS — HEIGHT: 64.5 IN | WEIGHT: 249.8 LBS | TEMPERATURE: 97.4 F | BODY MASS INDEX: 42.13 KG/M2

## 2021-01-01 VITALS — HEIGHT: 64.5 IN | WEIGHT: 270 LBS | BODY MASS INDEX: 45.53 KG/M2

## 2021-01-01 VITALS
DIASTOLIC BLOOD PRESSURE: 73 MMHG | TEMPERATURE: 98 F | OXYGEN SATURATION: 100 % | HEIGHT: 64 IN | HEART RATE: 89 BPM | SYSTOLIC BLOOD PRESSURE: 123 MMHG | RESPIRATION RATE: 18 BRPM

## 2021-01-01 VITALS — HEIGHT: 64.5 IN | WEIGHT: 262 LBS | BODY MASS INDEX: 44.19 KG/M2

## 2021-01-01 DIAGNOSIS — F43.10 POST-TRAUMATIC STRESS DISORDER, UNSPECIFIED: ICD-10-CM

## 2021-01-01 DIAGNOSIS — K59.00 CONSTIPATION, UNSPECIFIED: ICD-10-CM

## 2021-01-01 DIAGNOSIS — Z79.51 LONG TERM (CURRENT) USE OF INHALED STEROIDS: ICD-10-CM
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
## 2021-01-01 DIAGNOSIS — Z90.49 ACQUIRED ABSENCE OF OTHER SPECIFIED PARTS OF DIGESTIVE TRACT: Chronic | ICD-10-CM

## 2021-01-01 DIAGNOSIS — O03.9 COMPLETE OR UNSPECIFIED SPONTANEOUS ABORTION WITHOUT COMPLICATION: Chronic | ICD-10-CM

## 2021-01-01 DIAGNOSIS — E66.01 MORBID (SEVERE) OBESITY DUE TO EXCESS CALORIES: ICD-10-CM

## 2021-01-01 DIAGNOSIS — Z88.8 ALLERGY STATUS TO OTHER DRUGS, MEDICAMENTS AND BIOLOGICAL SUBSTANCES: ICD-10-CM

## 2021-01-01 DIAGNOSIS — M25.562 PAIN IN LEFT KNEE: ICD-10-CM

## 2021-01-01 DIAGNOSIS — J45.909 UNSPECIFIED ASTHMA, UNCOMPLICATED: ICD-10-CM

## 2021-01-01 DIAGNOSIS — F20.9 SCHIZOPHRENIA, UNSPECIFIED: ICD-10-CM
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
## 2021-01-01 DIAGNOSIS — F41.9 ANXIETY DISORDER, UNSPECIFIED: ICD-10-CM

## 2021-01-01 DIAGNOSIS — D50.9 IRON DEFICIENCY ANEMIA, UNSPECIFIED: ICD-10-CM

## 2021-01-01 DIAGNOSIS — Z90.49 ACQUIRED ABSENCE OF OTHER SPECIFIED PARTS OF DIGESTIVE TRACT: ICD-10-CM

## 2021-01-01 DIAGNOSIS — Z01.818 ENCOUNTER FOR OTHER PREPROCEDURAL EXAMINATION: ICD-10-CM

## 2021-01-01 DIAGNOSIS — Z88.0 ALLERGY STATUS TO PENICILLIN: ICD-10-CM

## 2021-01-01 DIAGNOSIS — R56.9 UNSPECIFIED CONVULSIONS: ICD-10-CM

## 2021-01-01 DIAGNOSIS — E66.9 OBESITY, UNSPECIFIED: ICD-10-CM

## 2021-01-01 DIAGNOSIS — E55.9 VITAMIN D DEFICIENCY, UNSPECIFIED: ICD-10-CM

## 2021-01-01 DIAGNOSIS — G40.909 EPILEPSY, UNSPECIFIED, NOT INTRACTABLE, WITHOUT STATUS EPILEPTICUS: ICD-10-CM

## 2021-01-01 DIAGNOSIS — G47.30 SLEEP APNEA, UNSPECIFIED: ICD-10-CM

## 2021-01-01 DIAGNOSIS — G47.33 OBSTRUCTIVE SLEEP APNEA (ADULT) (PEDIATRIC): ICD-10-CM

## 2021-01-01 DIAGNOSIS — Z87.898 PERSONAL HISTORY OF OTHER SPECIFIED CONDITIONS: ICD-10-CM
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
## 2021-01-01 DIAGNOSIS — Z11.59 ENCOUNTER FOR SCREENING FOR OTHER VIRAL DISEASES: ICD-10-CM

## 2021-01-01 LAB
A1C WITH ESTIMATED AVERAGE GLUCOSE RESULT: 5.4 % — SIGNIFICANT CHANGE UP (ref 4–5.6)
ALBUMIN SERPL ELPH-MCNC: 4.5 G/DL — SIGNIFICANT CHANGE UP (ref 3.5–5.2)
ALP SERPL-CCNC: 78 U/L — SIGNIFICANT CHANGE UP (ref 30–115)
ALT FLD-CCNC: 8 U/L — SIGNIFICANT CHANGE UP (ref 0–41)
ANION GAP SERPL CALC-SCNC: 10 MMOL/L — SIGNIFICANT CHANGE UP (ref 7–14)
ANION GAP SERPL CALC-SCNC: 12 MMOL/L — SIGNIFICANT CHANGE UP (ref 7–14)
ANION GAP SERPL CALC-SCNC: 8 MMOL/L — SIGNIFICANT CHANGE UP (ref 7–14)
ANION GAP SERPL CALC-SCNC: 9 MMOL/L — SIGNIFICANT CHANGE UP (ref 7–14)
APTT BLD: 34.3 SEC — SIGNIFICANT CHANGE UP (ref 27–39.2)
AST SERPL-CCNC: 13 U/L — SIGNIFICANT CHANGE UP (ref 0–41)
BASOPHILS # BLD AUTO: 0.07 K/UL — SIGNIFICANT CHANGE UP (ref 0–0.2)
BASOPHILS NFR BLD AUTO: 0.7 % — SIGNIFICANT CHANGE UP (ref 0–1)
BILIRUB SERPL-MCNC: <0.2 MG/DL — SIGNIFICANT CHANGE UP (ref 0.2–1.2)
BLD GP AB SCN SERPL QL: SIGNIFICANT CHANGE UP
BUN SERPL-MCNC: 10 MG/DL — SIGNIFICANT CHANGE UP (ref 10–20)
BUN SERPL-MCNC: 6 MG/DL — LOW (ref 10–20)
BUN SERPL-MCNC: 6 MG/DL — LOW (ref 10–20)
BUN SERPL-MCNC: 7 MG/DL — LOW (ref 10–20)
CALCIUM SERPL-MCNC: 8.6 MG/DL — SIGNIFICANT CHANGE UP (ref 8.5–10.1)
CALCIUM SERPL-MCNC: 9.1 MG/DL — SIGNIFICANT CHANGE UP (ref 8.5–10.1)
CALCIUM SERPL-MCNC: 9.1 MG/DL — SIGNIFICANT CHANGE UP (ref 8.5–10.1)
CALCIUM SERPL-MCNC: 9.8 MG/DL — SIGNIFICANT CHANGE UP (ref 8.5–10.1)
CHLORIDE SERPL-SCNC: 100 MMOL/L — SIGNIFICANT CHANGE UP (ref 98–110)
CHLORIDE SERPL-SCNC: 102 MMOL/L — SIGNIFICANT CHANGE UP (ref 98–110)
CHLORIDE SERPL-SCNC: 104 MMOL/L — SIGNIFICANT CHANGE UP (ref 98–110)
CHLORIDE SERPL-SCNC: 108 MMOL/L — SIGNIFICANT CHANGE UP (ref 98–110)
CO2 SERPL-SCNC: 22 MMOL/L — SIGNIFICANT CHANGE UP (ref 17–32)
CO2 SERPL-SCNC: 25 MMOL/L — SIGNIFICANT CHANGE UP (ref 17–32)
CO2 SERPL-SCNC: 26 MMOL/L — SIGNIFICANT CHANGE UP (ref 17–32)
CO2 SERPL-SCNC: 28 MMOL/L — SIGNIFICANT CHANGE UP (ref 17–32)
COVID-19 SPIKE DOMAIN AB INTERP: NEGATIVE — SIGNIFICANT CHANGE UP
COVID-19 SPIKE DOMAIN ANTIBODY RESULT: 0.4 U/ML — SIGNIFICANT CHANGE UP
CREAT SERPL-MCNC: 0.5 MG/DL — LOW (ref 0.7–1.5)
CREAT SERPL-MCNC: 0.6 MG/DL — LOW (ref 0.7–1.5)
CREAT SERPL-MCNC: 0.6 MG/DL — LOW (ref 0.7–1.5)
CREAT SERPL-MCNC: 0.7 MG/DL — SIGNIFICANT CHANGE UP (ref 0.7–1.5)
EOSINOPHIL # BLD AUTO: 0.39 K/UL — SIGNIFICANT CHANGE UP (ref 0–0.7)
EOSINOPHIL NFR BLD AUTO: 4.1 % — SIGNIFICANT CHANGE UP (ref 0–8)
ESTIMATED AVERAGE GLUCOSE: 108 MG/DL — SIGNIFICANT CHANGE UP (ref 68–114)
GLUCOSE SERPL-MCNC: 105 MG/DL — HIGH (ref 70–99)
GLUCOSE SERPL-MCNC: 111 MG/DL — HIGH (ref 70–99)
GLUCOSE SERPL-MCNC: 142 MG/DL — HIGH (ref 70–99)
GLUCOSE SERPL-MCNC: 98 MG/DL — SIGNIFICANT CHANGE UP (ref 70–99)
HCG SERPL-ACNC: <0.6 MIU/ML — SIGNIFICANT CHANGE UP
HCT VFR BLD CALC: 31 % — LOW (ref 37–47)
HCT VFR BLD CALC: 34.7 % — LOW (ref 37–47)
HCT VFR BLD CALC: 35 % — LOW (ref 37–47)
HCT VFR BLD CALC: 37.9 % — SIGNIFICANT CHANGE UP (ref 37–47)
HGB BLD-MCNC: 10.6 G/DL — LOW (ref 12–16)
HGB BLD-MCNC: 10.7 G/DL — LOW (ref 12–16)
HGB BLD-MCNC: 11.4 G/DL — LOW (ref 12–16)
HGB BLD-MCNC: 9.7 G/DL — LOW (ref 12–16)
IMM GRANULOCYTES NFR BLD AUTO: 0.2 % — SIGNIFICANT CHANGE UP (ref 0.1–0.3)
INR BLD: 0.97 RATIO — SIGNIFICANT CHANGE UP (ref 0.65–1.3)
LYMPHOCYTES # BLD AUTO: 3.13 K/UL — SIGNIFICANT CHANGE UP (ref 1.2–3.4)
LYMPHOCYTES # BLD AUTO: 32.8 % — SIGNIFICANT CHANGE UP (ref 20.5–51.1)
MAGNESIUM SERPL-MCNC: 1.6 MG/DL — LOW (ref 1.8–2.4)
MAGNESIUM SERPL-MCNC: 1.9 MG/DL — SIGNIFICANT CHANGE UP (ref 1.8–2.4)
MAGNESIUM SERPL-MCNC: 2 MG/DL — SIGNIFICANT CHANGE UP (ref 1.8–2.4)
MCHC RBC-ENTMCNC: 24.4 PG — LOW (ref 27–31)
MCHC RBC-ENTMCNC: 24.5 PG — LOW (ref 27–31)
MCHC RBC-ENTMCNC: 24.7 PG — LOW (ref 27–31)
MCHC RBC-ENTMCNC: 25.1 PG — LOW (ref 27–31)
MCHC RBC-ENTMCNC: 30.1 G/DL — LOW (ref 32–37)
MCHC RBC-ENTMCNC: 30.5 G/DL — LOW (ref 32–37)
MCHC RBC-ENTMCNC: 30.6 G/DL — LOW (ref 32–37)
MCHC RBC-ENTMCNC: 31.3 G/DL — LOW (ref 32–37)
MCV RBC AUTO: 80.1 FL — LOW (ref 81–99)
MCV RBC AUTO: 80.3 FL — LOW (ref 81–99)
MCV RBC AUTO: 80.6 FL — LOW (ref 81–99)
MCV RBC AUTO: 81 FL — SIGNIFICANT CHANGE UP (ref 81–99)
MONOCYTES # BLD AUTO: 0.74 K/UL — HIGH (ref 0.1–0.6)
MONOCYTES NFR BLD AUTO: 7.8 % — SIGNIFICANT CHANGE UP (ref 1.7–9.3)
NEUTROPHILS # BLD AUTO: 5.19 K/UL — SIGNIFICANT CHANGE UP (ref 1.4–6.5)
NEUTROPHILS NFR BLD AUTO: 54.4 % — SIGNIFICANT CHANGE UP (ref 42.2–75.2)
NRBC # BLD: 0 /100 WBCS — SIGNIFICANT CHANGE UP (ref 0–0)
PHOSPHATE SERPL-MCNC: 2.1 MG/DL — SIGNIFICANT CHANGE UP (ref 2.1–4.9)
PHOSPHATE SERPL-MCNC: 3.3 MG/DL — SIGNIFICANT CHANGE UP (ref 2.1–4.9)
PHOSPHATE SERPL-MCNC: 3.4 MG/DL — SIGNIFICANT CHANGE UP (ref 2.1–4.9)
PLATELET # BLD AUTO: 312 K/UL — SIGNIFICANT CHANGE UP (ref 130–400)
PLATELET # BLD AUTO: 320 K/UL — SIGNIFICANT CHANGE UP (ref 130–400)
PLATELET # BLD AUTO: 341 K/UL — SIGNIFICANT CHANGE UP (ref 130–400)
PLATELET # BLD AUTO: 407 K/UL — HIGH (ref 130–400)
POTASSIUM SERPL-MCNC: 3.7 MMOL/L — SIGNIFICANT CHANGE UP (ref 3.5–5)
POTASSIUM SERPL-MCNC: 4.3 MMOL/L — SIGNIFICANT CHANGE UP (ref 3.5–5)
POTASSIUM SERPL-MCNC: 4.4 MMOL/L — SIGNIFICANT CHANGE UP (ref 3.5–5)
POTASSIUM SERPL-MCNC: 4.4 MMOL/L — SIGNIFICANT CHANGE UP (ref 3.5–5)
POTASSIUM SERPL-SCNC: 3.7 MMOL/L — SIGNIFICANT CHANGE UP (ref 3.5–5)
POTASSIUM SERPL-SCNC: 4.3 MMOL/L — SIGNIFICANT CHANGE UP (ref 3.5–5)
POTASSIUM SERPL-SCNC: 4.4 MMOL/L — SIGNIFICANT CHANGE UP (ref 3.5–5)
POTASSIUM SERPL-SCNC: 4.4 MMOL/L — SIGNIFICANT CHANGE UP (ref 3.5–5)
PROT SERPL-MCNC: 7 G/DL — SIGNIFICANT CHANGE UP (ref 6–8)
PROTHROM AB SERPL-ACNC: 11.2 SEC — SIGNIFICANT CHANGE UP (ref 9.95–12.87)
RBC # BLD: 3.86 M/UL — LOW (ref 4.2–5.4)
RBC # BLD: 4.33 M/UL — SIGNIFICANT CHANGE UP (ref 4.2–5.4)
RBC # BLD: 4.34 M/UL — SIGNIFICANT CHANGE UP (ref 4.2–5.4)
RBC # BLD: 4.68 M/UL — SIGNIFICANT CHANGE UP (ref 4.2–5.4)
RBC # FLD: 13.9 % — SIGNIFICANT CHANGE UP (ref 11.5–14.5)
RBC # FLD: 13.9 % — SIGNIFICANT CHANGE UP (ref 11.5–14.5)
RBC # FLD: 14 % — SIGNIFICANT CHANGE UP (ref 11.5–14.5)
RBC # FLD: 14.2 % — SIGNIFICANT CHANGE UP (ref 11.5–14.5)
SARS-COV-2 IGG+IGM SERPL QL IA: 0.4 U/ML — SIGNIFICANT CHANGE UP
SARS-COV-2 IGG+IGM SERPL QL IA: NEGATIVE — SIGNIFICANT CHANGE UP
SODIUM SERPL-SCNC: 134 MMOL/L — LOW (ref 135–146)
SODIUM SERPL-SCNC: 138 MMOL/L — SIGNIFICANT CHANGE UP (ref 135–146)
SODIUM SERPL-SCNC: 140 MMOL/L — SIGNIFICANT CHANGE UP (ref 135–146)
SODIUM SERPL-SCNC: 142 MMOL/L — SIGNIFICANT CHANGE UP (ref 135–146)
SURGICAL PATHOLOGY STUDY: SIGNIFICANT CHANGE UP
WBC # BLD: 10.05 K/UL — SIGNIFICANT CHANGE UP (ref 4.8–10.8)
WBC # BLD: 12.91 K/UL — HIGH (ref 4.8–10.8)
WBC # BLD: 13.02 K/UL — HIGH (ref 4.8–10.8)
WBC # BLD: 9.54 K/UL — SIGNIFICANT CHANGE UP (ref 4.8–10.8)
WBC # FLD AUTO: 10.05 K/UL — SIGNIFICANT CHANGE UP (ref 4.8–10.8)
WBC # FLD AUTO: 12.91 K/UL — HIGH (ref 4.8–10.8)
WBC # FLD AUTO: 13.02 K/UL — HIGH (ref 4.8–10.8)
WBC # FLD AUTO: 9.54 K/UL — SIGNIFICANT CHANGE UP (ref 4.8–10.8)

## 2021-01-01 PROCEDURE — 99212 OFFICE O/P EST SF 10 MIN: CPT

## 2021-01-01 PROCEDURE — 93010 ELECTROCARDIOGRAM REPORT: CPT | Mod: NC

## 2021-01-01 PROCEDURE — 99243 OFF/OP CNSLTJ NEW/EST LOW 30: CPT

## 2021-01-01 PROCEDURE — S2900 ROBOTIC SURGICAL SYSTEM: CPT | Mod: NC

## 2021-01-01 PROCEDURE — 71045 X-RAY EXAM CHEST 1 VIEW: CPT | Mod: 26

## 2021-01-01 PROCEDURE — 93010 ELECTROCARDIOGRAM REPORT: CPT

## 2021-01-01 PROCEDURE — ZZZZZ: CPT

## 2021-01-01 PROCEDURE — 99024 POSTOP FOLLOW-UP VISIT: CPT

## 2021-01-01 PROCEDURE — 99233 SBSQ HOSP IP/OBS HIGH 50: CPT

## 2021-01-01 PROCEDURE — 99284 EMERGENCY DEPT VISIT MOD MDM: CPT

## 2021-01-01 PROCEDURE — 99253 IP/OBS CNSLTJ NEW/EST LOW 45: CPT | Mod: 24

## 2021-01-01 PROCEDURE — 88305 TISSUE EXAM BY PATHOLOGIST: CPT | Mod: 26

## 2021-01-01 PROCEDURE — 99283 EMERGENCY DEPT VISIT LOW MDM: CPT

## 2021-01-01 PROCEDURE — 43775 LAP SLEEVE GASTRECTOMY: CPT

## 2021-01-01 PROCEDURE — 73564 X-RAY EXAM KNEE 4 OR MORE: CPT | Mod: 26,LT

## 2021-01-01 RX ORDER — ERGOCALCIFEROL 1.25 MG/1
1.25 MG CAPSULE, LIQUID FILLED ORAL
Qty: 4 | Refills: 1 | Status: DISCONTINUED | COMMUNITY
Start: 2021-01-06 | End: 2021-01-01

## 2021-01-01 RX ORDER — HYDROMORPHONE HYDROCHLORIDE 2 MG/ML
0.25 INJECTION INTRAMUSCULAR; INTRAVENOUS; SUBCUTANEOUS EVERY 4 HOURS
Refills: 0 | Status: DISCONTINUED | OUTPATIENT
Start: 2021-01-01 | End: 2021-01-01

## 2021-01-01 RX ORDER — QUETIAPINE FUMARATE 25 MG/1
25 TABLET ORAL
Qty: 30 | Refills: 0 | Status: DISCONTINUED | COMMUNITY
Start: 2019-10-17 | End: 2021-01-01

## 2021-01-01 RX ORDER — ASPIRIN 81 MG
600-400 TABLET, DELAYED RELEASE (ENTERIC COATED) ORAL DAILY
Refills: 0 | Status: ACTIVE | COMMUNITY

## 2021-01-01 RX ORDER — CABERGOLINE 0.5 MG/1
0.5 TABLET ORAL WEEKLY
Qty: 4 | Refills: 5 | Status: DISCONTINUED | COMMUNITY
Start: 2019-10-17 | End: 2021-01-01

## 2021-01-01 RX ORDER — ERGOCALCIFEROL 1.25 MG/1
1.25 MG CAPSULE, LIQUID FILLED ORAL
Qty: 30 | Refills: 1 | Status: ACTIVE | COMMUNITY

## 2021-01-01 RX ORDER — ALBUTEROL 90 UG/1
2 AEROSOL, METERED ORAL EVERY 6 HOURS
Refills: 0 | Status: DISCONTINUED | OUTPATIENT
Start: 2021-01-01 | End: 2021-01-01

## 2021-01-01 RX ORDER — MAGNESIUM SULFATE 500 MG/ML
1 VIAL (ML) INJECTION ONCE
Refills: 0 | Status: COMPLETED | OUTPATIENT
Start: 2021-01-01 | End: 2021-01-01

## 2021-01-01 RX ORDER — HYDROMORPHONE HYDROCHLORIDE 2 MG/ML
0.5 INJECTION INTRAMUSCULAR; INTRAVENOUS; SUBCUTANEOUS
Refills: 0 | Status: DISCONTINUED | OUTPATIENT
Start: 2021-01-01 | End: 2021-01-01

## 2021-01-01 RX ORDER — POTASSIUM PHOSPHATE, MONOBASIC POTASSIUM PHOSPHATE, DIBASIC 236; 224 MG/ML; MG/ML
30 INJECTION, SOLUTION INTRAVENOUS ONCE
Refills: 0 | Status: COMPLETED | OUTPATIENT
Start: 2021-01-01 | End: 2021-01-01

## 2021-01-01 RX ORDER — OLANZAPINE 10 MG/1
10 TABLET, FILM COATED ORAL
Qty: 30 | Refills: 0 | Status: DISCONTINUED | COMMUNITY
Start: 2019-10-17 | End: 2021-01-01

## 2021-01-01 RX ORDER — BUPIVACAINE 13.3 MG/ML
20 INJECTION, SUSPENSION, LIPOSOMAL INFILTRATION ONCE
Refills: 0 | Status: DISCONTINUED | OUTPATIENT
Start: 2021-01-01 | End: 2021-01-01

## 2021-01-01 RX ORDER — IBUPROFEN 200 MG
600 TABLET ORAL ONCE
Refills: 0 | Status: COMPLETED | OUTPATIENT
Start: 2021-01-01 | End: 2021-01-01

## 2021-01-01 RX ORDER — HYDROMORPHONE HYDROCHLORIDE 2 MG/ML
0.25 INJECTION INTRAMUSCULAR; INTRAVENOUS; SUBCUTANEOUS EVERY 6 HOURS
Refills: 0 | Status: DISCONTINUED | OUTPATIENT
Start: 2021-01-01 | End: 2021-01-01

## 2021-01-01 RX ORDER — PROCHLORPERAZINE MALEATE 5 MG
5 TABLET ORAL EVERY 6 HOURS
Refills: 0 | Status: DISCONTINUED | OUTPATIENT
Start: 2021-01-01 | End: 2021-01-01

## 2021-01-01 RX ORDER — ONDANSETRON 8 MG/1
4 TABLET, FILM COATED ORAL ONCE
Refills: 0 | Status: DISCONTINUED | OUTPATIENT
Start: 2021-01-01 | End: 2021-01-01

## 2021-01-01 RX ORDER — HEPARIN SODIUM 5000 [USP'U]/ML
5000 INJECTION INTRAVENOUS; SUBCUTANEOUS ONCE
Refills: 0 | Status: COMPLETED | OUTPATIENT
Start: 2021-01-01 | End: 2021-01-01

## 2021-01-01 RX ORDER — SODIUM CHLORIDE 9 MG/ML
1000 INJECTION, SOLUTION INTRAVENOUS
Refills: 0 | Status: DISCONTINUED | OUTPATIENT
Start: 2021-01-01 | End: 2021-01-01

## 2021-01-01 RX ORDER — OMEPRAZOLE 40 MG/1
40 CAPSULE, DELAYED RELEASE ORAL
Qty: 30 | Refills: 2 | Status: ACTIVE | COMMUNITY
Start: 2021-01-01 | End: 1900-01-01

## 2021-01-01 RX ORDER — GABAPENTIN 400 MG/1
125 CAPSULE ORAL THREE TIMES A DAY
Refills: 0 | Status: DISCONTINUED | OUTPATIENT
Start: 2021-01-01 | End: 2021-01-01

## 2021-01-01 RX ORDER — KETOROLAC TROMETHAMINE 30 MG/ML
15 SYRINGE (ML) INJECTION EVERY 6 HOURS
Refills: 0 | Status: DISCONTINUED | OUTPATIENT
Start: 2021-01-01 | End: 2021-01-01

## 2021-01-01 RX ORDER — ACETAMINOPHEN 500 MG
1000 TABLET ORAL ONCE
Refills: 0 | Status: COMPLETED | OUTPATIENT
Start: 2021-01-01 | End: 2021-01-01

## 2021-01-01 RX ORDER — HEPARIN SODIUM 5000 [USP'U]/ML
5000 INJECTION INTRAVENOUS; SUBCUTANEOUS EVERY 8 HOURS
Refills: 0 | Status: DISCONTINUED | OUTPATIENT
Start: 2021-01-01 | End: 2021-01-01

## 2021-01-01 RX ORDER — ONDANSETRON 8 MG/1
4 TABLET, FILM COATED ORAL EVERY 6 HOURS
Refills: 0 | Status: DISCONTINUED | OUTPATIENT
Start: 2021-01-01 | End: 2021-01-01

## 2021-01-01 RX ORDER — MORPHINE SULFATE 50 MG/1
2 CAPSULE, EXTENDED RELEASE ORAL
Refills: 0 | Status: DISCONTINUED | OUTPATIENT
Start: 2021-01-01 | End: 2021-01-01

## 2021-01-01 RX ORDER — OXYCODONE AND ACETAMINOPHEN 5; 325 MG/1; MG/1
1 TABLET ORAL ONCE
Refills: 0 | Status: DISCONTINUED | OUTPATIENT
Start: 2021-01-01 | End: 2021-01-01

## 2021-01-01 RX ORDER — PANTOPRAZOLE SODIUM 20 MG/1
40 TABLET, DELAYED RELEASE ORAL DAILY
Refills: 0 | Status: DISCONTINUED | OUTPATIENT
Start: 2021-01-01 | End: 2021-01-01

## 2021-01-01 RX ORDER — HYDROXYZINE HYDROCHLORIDE 25 MG/1
25 TABLET ORAL DAILY
Refills: 0 | Status: DISCONTINUED | COMMUNITY
Start: 2019-10-17 | End: 2021-01-01

## 2021-01-01 RX ORDER — FERROUS SULFATE 325(65) MG
325 TABLET ORAL DAILY
Refills: 0 | Status: ACTIVE | COMMUNITY

## 2021-01-01 RX ORDER — ONDANSETRON 8 MG/1
1 TABLET, FILM COATED ORAL
Qty: 5 | Refills: 0
Start: 2021-01-01

## 2021-01-01 RX ORDER — ACETAMINOPHEN 500 MG
1000 TABLET ORAL ONCE
Refills: 0 | Status: DISCONTINUED | OUTPATIENT
Start: 2021-01-01 | End: 2021-01-01

## 2021-01-01 RX ORDER — MAGNESIUM SULFATE 500 MG/ML
2 VIAL (ML) INJECTION ONCE
Refills: 0 | Status: COMPLETED | OUTPATIENT
Start: 2021-01-01 | End: 2021-01-01

## 2021-01-01 RX ORDER — TOPIRAMATE 50 MG/1
TABLET, COATED ORAL DAILY
Refills: 0 | Status: ACTIVE | COMMUNITY

## 2021-01-01 RX ORDER — NICOTINE POLACRILEX 2 MG
1 GUM BUCCAL ONCE
Refills: 0 | Status: COMPLETED | OUTPATIENT
Start: 2021-01-01 | End: 2021-01-01

## 2021-01-01 RX ORDER — GABAPENTIN 250 MG/5ML
250 SOLUTION ORAL EVERY 8 HOURS
Qty: 15 | Refills: 0 | Status: COMPLETED | COMMUNITY
Start: 2021-01-01 | End: 2021-01-01

## 2021-01-01 RX ORDER — URSODIOL 300 MG/1
300 CAPSULE ORAL
Qty: 90 | Refills: 3 | Status: ACTIVE | COMMUNITY
Start: 2021-01-01 | End: 1900-01-01

## 2021-01-01 RX ADMIN — Medication 100 GRAM(S): at 02:43

## 2021-01-01 RX ADMIN — PANTOPRAZOLE SODIUM 40 MILLIGRAM(S): 20 TABLET, DELAYED RELEASE ORAL at 13:16

## 2021-01-01 RX ADMIN — HEPARIN SODIUM 5000 UNIT(S): 5000 INJECTION INTRAVENOUS; SUBCUTANEOUS at 23:27

## 2021-01-01 RX ADMIN — Medication 400 MILLIGRAM(S): at 23:39

## 2021-01-01 RX ADMIN — Medication 15 MILLIGRAM(S): at 15:45

## 2021-01-01 RX ADMIN — GABAPENTIN 125 MILLIGRAM(S): 400 CAPSULE ORAL at 05:54

## 2021-01-01 RX ADMIN — SODIUM CHLORIDE 100 MILLILITER(S): 9 INJECTION, SOLUTION INTRAVENOUS at 11:02

## 2021-01-01 RX ADMIN — HYDROMORPHONE HYDROCHLORIDE 0.25 MILLIGRAM(S): 2 INJECTION INTRAMUSCULAR; INTRAVENOUS; SUBCUTANEOUS at 05:10

## 2021-01-01 RX ADMIN — HEPARIN SODIUM 5000 UNIT(S): 5000 INJECTION INTRAVENOUS; SUBCUTANEOUS at 14:47

## 2021-01-01 RX ADMIN — HEPARIN SODIUM 5000 UNIT(S): 5000 INJECTION INTRAVENOUS; SUBCUTANEOUS at 05:53

## 2021-01-01 RX ADMIN — Medication 5 MILLIGRAM(S): at 15:36

## 2021-01-01 RX ADMIN — Medication 100 MILLIGRAM(S): at 15:37

## 2021-01-01 RX ADMIN — Medication 100 MILLIGRAM(S): at 06:20

## 2021-01-01 RX ADMIN — Medication 1000 MILLIGRAM(S): at 07:31

## 2021-01-01 RX ADMIN — HEPARIN SODIUM 5000 UNIT(S): 5000 INJECTION INTRAVENOUS; SUBCUTANEOUS at 22:11

## 2021-01-01 RX ADMIN — Medication 100 MILLIGRAM(S): at 22:11

## 2021-01-01 RX ADMIN — GABAPENTIN 125 MILLIGRAM(S): 400 CAPSULE ORAL at 23:27

## 2021-01-01 RX ADMIN — Medication 600 MILLIGRAM(S): at 19:38

## 2021-01-01 RX ADMIN — HYDROMORPHONE HYDROCHLORIDE 0.25 MILLIGRAM(S): 2 INJECTION INTRAMUSCULAR; INTRAVENOUS; SUBCUTANEOUS at 11:46

## 2021-01-01 RX ADMIN — GABAPENTIN 125 MILLIGRAM(S): 400 CAPSULE ORAL at 15:32

## 2021-01-01 RX ADMIN — HYDROMORPHONE HYDROCHLORIDE 0.25 MILLIGRAM(S): 2 INJECTION INTRAMUSCULAR; INTRAVENOUS; SUBCUTANEOUS at 20:26

## 2021-01-01 RX ADMIN — POTASSIUM PHOSPHATE, MONOBASIC POTASSIUM PHOSPHATE, DIBASIC 83.33 MILLIMOLE(S): 236; 224 INJECTION, SOLUTION INTRAVENOUS at 05:09

## 2021-01-01 RX ADMIN — PANTOPRAZOLE SODIUM 40 MILLIGRAM(S): 20 TABLET, DELAYED RELEASE ORAL at 12:20

## 2021-01-01 RX ADMIN — Medication 25 GRAM(S): at 00:30

## 2021-01-01 RX ADMIN — Medication 400 MILLIGRAM(S): at 12:37

## 2021-01-01 RX ADMIN — PANTOPRAZOLE SODIUM 40 MILLIGRAM(S): 20 TABLET, DELAYED RELEASE ORAL at 12:37

## 2021-01-01 RX ADMIN — GABAPENTIN 125 MILLIGRAM(S): 400 CAPSULE ORAL at 05:10

## 2021-01-01 RX ADMIN — HEPARIN SODIUM 5000 UNIT(S): 5000 INJECTION INTRAVENOUS; SUBCUTANEOUS at 15:36

## 2021-01-01 RX ADMIN — HYDROMORPHONE HYDROCHLORIDE 0.25 MILLIGRAM(S): 2 INJECTION INTRAMUSCULAR; INTRAVENOUS; SUBCUTANEOUS at 11:26

## 2021-01-01 RX ADMIN — Medication 1000 MILLIGRAM(S): at 14:47

## 2021-01-01 RX ADMIN — HEPARIN SODIUM 5000 UNIT(S): 5000 INJECTION INTRAVENOUS; SUBCUTANEOUS at 07:22

## 2021-01-01 RX ADMIN — Medication 400 MILLIGRAM(S): at 17:24

## 2021-01-01 RX ADMIN — Medication 400 MILLIGRAM(S): at 05:54

## 2021-01-01 RX ADMIN — HEPARIN SODIUM 5000 UNIT(S): 5000 INJECTION INTRAVENOUS; SUBCUTANEOUS at 05:10

## 2021-01-06 RX ORDER — FOLIC ACID 1 MG/1
1 TABLET ORAL DAILY
Qty: 30 | Refills: 1 | Status: ACTIVE | COMMUNITY
Start: 2021-01-06 | End: 1900-01-01

## 2021-01-07 ENCOUNTER — APPOINTMENT (OUTPATIENT)
Dept: SURGERY | Facility: CLINIC | Age: 41
End: 2021-01-07
Payer: MEDICAID

## 2021-01-07 VITALS — BODY MASS INDEX: 45.87 KG/M2 | WEIGHT: 272 LBS | HEIGHT: 64.5 IN

## 2021-01-07 PROCEDURE — ZZZZZ: CPT

## 2021-02-10 NOTE — ED PROVIDER NOTE - NSFOLLOWUPINSTRUCTIONS_ED_ALL_ED_FT
Please follow up with your primary care physician within 24-72 hours and return immediately if symptoms worsen.    Generalized Anxiety Disorder, Adult  Generalized anxiety disorder (MAHAD) is a mental health disorder. People with this condition constantly worry about everyday events. Unlike normal anxiety, worry related to MAHAD is not triggered by a specific event. These worries also do not fade or get better with time. MAHAD interferes with life functions, including relationships, work, and school.    MAHAD can vary from mild to severe. People with severe MAHAD can have intense waves of anxiety with physical symptoms (panic attacks).    What are the causes?  The exact cause of MAHAD is not known.    What increases the risk?  This condition is more likely to develop in:    Women.  People who have a family history of anxiety disorders.  People who are very shy.  People who experience very stressful life events, such as the death of a loved one.  People who have a very stressful family environment.    What are the signs or symptoms?  People with MAHAD often worry excessively about many things in their lives, such as their health and family. They may also be overly concerned about:    Doing well at work.  Being on time.  Natural disasters.  Friendships.    Physical symptoms of MAHAD include:    Fatigue.  Muscle tension or having muscle twitches.  Trembling or feeling shaky.  Being easily startled.  Feeling like your heart is pounding or racing.  Feeling out of breath or like you cannot take a deep breath.  Having trouble falling asleep or staying asleep.  Sweating.  Nausea, diarrhea, or irritable bowel syndrome (IBS).  Headaches.  Trouble concentrating or remembering facts.  Restlessness.  Irritability.    How is this diagnosed?  Your health care provider can diagnose MAHAD based on your symptoms and medical history. You will also have a physical exam. The health care provider will ask specific questions about your symptoms, including how severe they are, when they started, and if they come and go. Your health care provider may ask you about your use of alcohol or drugs, including prescription medicines. Your health care provider may refer you to a mental health specialist for further evaluation.    Your health care provider will do a thorough examination and may perform additional tests to rule out other possible causes of your symptoms.    To be diagnosed with MAHAD, a person must have anxiety that:    Is out of his or her control.  Affects several different aspects of his or her life, such as work and relationships.  Causes distress that makes him or her unable to take part in normal activities.  Includes at least three physical symptoms of MAHAD, such as restlessness, fatigue, trouble concentrating, irritability, muscle tension, or sleep problems.    Before your health care provider can confirm a diagnosis of MAHAD, these symptoms must be present more days than they are not, and they must last for six months or longer.    How is this treated?  The following therapies are usually used to treat MAHAD:    Medicine. Antidepressant medicine is usually prescribed for long-term daily control. Antianxiety medicines may be added in severe cases, especially when panic attacks occur.  Talk therapy (psychotherapy). Certain types of talk therapy can be helpful in treating MAHAD by providing support, education, and guidance. Options include:    Cognitive behavioral therapy (CBT). People learn coping skills and techniques to ease their anxiety. They learn to identify unrealistic or negative thoughts and behaviors and to replace them with positive ones.  Acceptance and commitment therapy (ACT). This treatment teaches people how to be mindful as a way to cope with unwanted thoughts and feelings.  Biofeedback. This process trains you to manage your body's response (physiological response) through breathing techniques and relaxation methods. You will work with a therapist while machines are used to monitor your physical symptoms.    Stress management techniques. These include yoga, meditation, and exercise.    ImageA mental health specialist can help determine which treatment is best for you. Some people see improvement with one type of therapy. However, other people require a combination of therapies.    Follow these instructions at home:  Take over-the-counter and prescription medicines only as told by your health care provider.  Try to maintain a normal routine.  Try to anticipate stressful situations and allow extra time to manage them.  Practice any stress management or self-calming techniques as taught by your health care provider.  Do not punish yourself for setbacks or for not making progress.  Try to recognize your accomplishments, even if they are small.  Keep all follow-up visits as told by your health care provider. This is important.  Contact a health care provider if:  Your symptoms do not get better.  Your symptoms get worse.  You have signs of depression, such as:    A persistently sad, cranky, or irritable mood.  Loss of enjoyment in activities that used to bring you ainsley.  Change in weight or eating.  Changes in sleeping habits.  Avoiding friends or family members.  Loss of energy for normal tasks.  Feelings of guilt or worthlessness.    Get help right away if:  You have serious thoughts about hurting yourself or others.  If you ever feel like you may hurt yourself or others, or have thoughts about taking your own life, get help right away. You can go to your nearest emergency department or call:     Your local emergency services (911 in the [place]   A suicide crisis helpline, such as the National Suicide Prevention Lifeline at 1-307.277.2761. This is open 24 hours a day.     Summary  Generalized anxiety disorder (MAHAD) is a mental health disorder that involves worry that is not triggered by a specific event.  People with MAHAD often worry excessively about many things in their lives, such as their health and family.  MAHAD may cause physical symptoms such as restlessness, trouble concentrating, sleep problems, frequent sweating, nausea, diarrhea, headaches, and trembling or muscle twitching.  A mental health specialist can help determine which treatment is best for you. Some people see improvement with one type of therapy. However, other people require a combination of therapies.  This information is not intended to replace advice given to you by your health care provider. Make sure you discuss any questions you have with your health care provider.

## 2021-02-10 NOTE — ED BEHAVIORAL HEALTH ASSESSMENT NOTE - HPI (INCLUDE ILLNESS QUALITY, SEVERITY, DURATION, TIMING, CONTEXT, MODIFYING FACTORS, ASSOCIATED SIGNS AND SYMPTOMS)
Karla Lazaro is a 40-year-old single, unemployed, , female, domiciled with her mother and 6 year old son; medical history of seizure disorder and asthma; with a self reported psychiatric history of schizophrenia, PTSD, and anxiety presented to the emergency room requesting medication refills and reporting self injurious behavior (left forearm superficial cuts vertically).     On interview appeared initially with pronounced startle response, otherwise engaged, mildly tangential, concrete thinking, oddly related.     Stated that she is preparing for bariatric surgery and needed pulmonary clearance. Had sleep study done as part of pulmonary clearance. Received report from pulmonologist that she had sleep apnea (received news yesterday); states she was "shocked and scared" that she may die, especially after the doctor reported to her that "I stopped breathing 20 times in my sleep"; states in a state of heightened fear due to the news of her sleep apnea diagnosis that she cut herself superficially to "make sure I was still alive"; denying plans to hurt herself, commit suicide during this time. Appears remorseful about the cuts stating that she regrets her behavior and doesn't want to jeopardize her relationship with her 6 year old son.     Reports recently she has had a euthymic mood, describing her cutting behavior as impulsive and not indicative of acute depressive sx. Has been engaged with therapy through clinic in [facility] psychiatric clinic. Has also plans to see psychiatrist in early March, which she will attempt to expedite on discharge. Has not taken psychiatric medication since September 2020.     Denied use of substances.     Spoke with Mother Ms. Armenta at [phone] - confirmed above, denied acute concern for her mental health stating that she has been doing well, states that her cutting behavior was isolated incident as far as she can tell.

## 2021-02-10 NOTE — ED ADULT NURSE REASSESSMENT NOTE - NS ED NURSE REASSESS COMMENT FT1
pt refusing blood work and all medical interventions at this time. pt refusing to get undressed at this time. NASEEM Nelson made aware pending psych eval

## 2021-02-10 NOTE — ED BEHAVIORAL HEALTH ASSESSMENT NOTE - ADDITIONAL DETAILS ALL
Risk factors include cutting behavior, history of trauma, axis 1 psychiatric diagnosis, difficulty engaging in psychiatric treatment; protective factors include no acute suicidality, feelings of responsibility towards 6 year old son and mother, Christianity belief against suicide, future oriented, access to emergency services and known to utilize in times of distress; chronically elevated risk, moderately elevated risk of self injurious behavior, low acute risk of suicidal behavior.

## 2021-02-10 NOTE — ED BEHAVIORAL HEALTH ASSESSMENT NOTE - DESCRIPTION
did not explore on this assessment 1:1 present; easily startled, otherwise very cooperative and engaged in interview seizure disorder, asthma

## 2021-02-10 NOTE — ED BEHAVIORAL HEALTH ASSESSMENT NOTE - SUMMARY
Karla Lazaro is a 40-year-old single, unemployed, , female, domiciled with her mother and 6 year old son; medical history of seizure disorder and asthma; with a self reported psychiatric history of schizophrenia, PTSD, and anxiety presented to the emergency room requesting medication refills and reporting self injurious behavior (left forearm superficial cuts vertically).     Based on assessment and collateral; self injurious behavior appears to have been in the context of misunderstanding with medical provider and acute dysphoria due to feeling that sleep apnea was potentially terminal diagnosis. Given her concrete style of thinking, she will benefit from detailed medical explanations. She is not currently suicidal or with acute psychiatric decompensation. She plans to pursue psychiatric care through her established treatment at [facility] and declined referral to services (PHP/OPD [facility]).
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
## 2021-02-10 NOTE — ED ADULT NURSE REASSESSMENT NOTE - NS ED NURSE REASSESS COMMENT FT1
Security called by R to collect patients belongings. patient still refusing . As per NASEEM Ng, pt does not have to change into a gown at this time. pt remains on 1:1 . Pt refusing all care. all safety measures maintained.

## 2021-02-10 NOTE — ED PROVIDER NOTE - CLINICAL SUMMARY MEDICAL DECISION MAKING FREE TEXT BOX
39 y/o F PMHx seizures on Topamax, asthma, schizophrenia. Pt states that she has not been able to see her psychiatrist since October. Yesterday she had her f/u for the results of her sleep apnea test. After the results, she felt like she could not tell if she was dreaming or alive so she took a steak knife and cut her L arm. Vs reviewed. patient placed on a 1:1. Patient seen and evaluated by me.  Patient on a 1:1 and psychiatry consulted. psychiatry evaluated patient.  Patient is to follow up with out patient psychiatry.

## 2021-02-10 NOTE — ED ADULT NURSE NOTE - CHIEF COMPLAINT QUOTE
presents for psych evaluation. "Im not taking my medications, I cut myself yesterday" Hx of schizophrenia

## 2021-02-10 NOTE — ED PROVIDER NOTE - PATIENT PORTAL LINK FT
You can access the FollowMyHealth Patient Portal offered by [facility] by registering at the following website: [email or web address]. By joining [employer]’s FollowMyHealth portal, you will also be able to view your health information using other applications (apps) compatible with our system.

## 2021-02-10 NOTE — ED ADULT NURSE NOTE - NSIMPLEMENTINTERV_GEN_ALL_ED
Implemented All Fall Risk Interventions:  Jet to call system. Call bell, personal items and telephone within reach. Instruct patient to call for assistance. Room bathroom lighting operational. Non-slip footwear when patient is off stretcher. Physically safe environment: no spills, clutter or unnecessary equipment. Stretcher in lowest position, wheels locked, appropriate side rails in place. Provide visual cue, wrist band, yellow gown, etc. Monitor gait and stability. Monitor for mental status changes and reorient to person, place, and time. Review medications for side effects contributing to fall risk. Reinforce activity limits and safety measures with patient and family.

## 2021-02-10 NOTE — ED BEHAVIORAL HEALTH ASSESSMENT NOTE - RISK ASSESSMENT
Low Acute Suicide Risk Risk factors include cutting behavior, history of trauma, axis 1 psychiatric diagnosis, difficulty engaging in psychiatric treatment; protective factors include no acute suicidality, feelings of responsibility towards 6 year old son and mother, Scientology belief against suicide, future oriented, access to emergency services and known to utilize in times of distress; chronically elevated risk, moderately elevated risk of self injurious behavior, low acute risk of suicidal behavior.

## 2021-02-10 NOTE — ED PROVIDER NOTE - OBJECTIVE STATEMENT
41 yo female with a pmh of seizures on topamax, asthma, and schizophrenia presents for evaluation. pt has not been able to see a psychiatrist since October due to scheduling issues and thus has not been on her prescribed psychiatric medication as well. pt received new that she has sleep apnea yesterday, which caused her stress/anxiety and states she cut her forearm with a steak knife to make sure she was alive. pt denies any thoughts of harming herself or others and denies any hallucinations. denies any other symptoms including fevers, chill, headache, recent illness/travel, cough, abdominal pain, chest pain, or SOB.

## 2021-02-10 NOTE — ED PROVIDER NOTE - NSFOLLOWUPCLINICS_GEN_ALL_ED_FT
[facility]  Medicine  [address], [place]   Phone: [phone]  Fax:   Follow Up Time:

## 2021-02-10 NOTE — ED BEHAVIORAL HEALTH ASSESSMENT NOTE - CURRENT MEDICATION
Topiramate 125mg PO QDaily, albuterol inhaler (per pt)     Regimen in October 2020 as documented by prior physician   Olanzapine 20 mg PO daily  Seroquel 50 mg PO Nightly  Vistaril 25 mg PO TID

## 2021-02-10 NOTE — ED PROVIDER NOTE - PROGRESS NOTE DETAILS
pt evaluated by psych and cleared. spoke with pt and she is comfortable following up with her psychiatrist outpatient. 41 y/o F PMHx seizures on Topamax, asthma, schizophrenia. Pt states that she has not been able to see her psychiatrist since October. Yesterday she had her f/u for the results of her sleep apnea test. After the results, she felt like she could not tell if she was dreaming or alive so she took a steak knife and cut her L arm. Denies any SI/HI. States she just wanted to feel something to see if she was awake and not dreaming. No drug use. Pt does not hear voices or see anything. No fever, cough, or SOB. Exam: CONSTITUTIONAL: WA / WN / NAD. HEAD: NCAT. EYES: PERRL; EOMI; anicteric. ENT: Normal pharynx; mucous membranes pink/moist, no erythema. NECK: Supple; no meningeal signs CARD: RRR; nl S1/S2; no M/R/G. Pulses equal bilaterally. RESP: Respiratory rate and effort are normal; breath sounds clear and equal bilaterally. ABD: Soft, NT ND nl bowel sounds; no masses; no rebound. MSK/EXT: No gross deformities; full range of motion. SKIN: x4 linear abrasions to L forearm, tetanus updated in 2015;  NEURO: AAOx3, PSYCH: Memory Intact, Normal Affect.

## 2021-02-15 NOTE — ED PROVIDER NOTE - CARE PROVIDER_API CALL
Bonifacio Young)  Orthopaedic Surgery  [address] [place] [address]  Phone: [phone]  Fax: [phone]  Follow Up Time: 1-3 Days

## 2021-02-15 NOTE — ED ADULT NURSE NOTE - MODE OF DISCHARGE
crutches given to pt with crutch training; pt verbalized understanding and performed return demo/Ambulatory with cane/crutches/walker

## 2021-02-15 NOTE — ED PROVIDER NOTE - NS ED ROS FT
Review of Systems:  	•	CONSTITUTIONAL: no fever, no diaphoresis, no chills  	•	SKIN: no rash  	•	HEMATOLOGIC: no bleeding, no bruising  	•	MUSCULOSKELETAL: +L knee pain/swelling. no redness  	•	NEUROLOGIC: no weakness, no numbness, no paresthesias

## 2021-02-15 NOTE — ED PROVIDER NOTE - ATTENDING CONTRIBUTION TO CARE
39 y/o female h/o asthma, sz, c/o lft knee pain s/p slip and fall on wet surface yesterday, landed on knee, sx are constant, worse with certain movements and position, better with rest, denies head trauma, LOC, paresthesias, focal weakness, or other associated complaints at present.     Old chart reviewed.  I have reviewed and agree with the nursing note, except as documented in my note.    VSS, awake, alert, in NAD, no skin lacerations or abrasions, LLE; no hip, ankle or foot tenderness, lft knee; + swelling and ant ttp, no crepitus, joint line tenderness, able to fully flex and extend knee with encouragement, no joint laxity noted on varus or valgus stress at 0 and 30 degrees, normal Lachman and posterior drawer, motor and sensation intact distally, NV intact with 2+ DP pulses, antalgic gait.      XR neg for fx, unlikely infectious or vascular etiology, recommend supportive care measures including RICE, outpt f/u next available appt for further evaluation and management. Ace wrap and crutches given.    The patient was given detailed return precautions and advised to return to the emergency department if any new symptoms developed, symptoms worsened or for any concerns. The patient was offered the opportunity to ask questions and verbalized that they understand the diagnosis and discharge instructions.

## 2021-02-15 NOTE — ED PROVIDER NOTE - NSFOLLOWUPINSTRUCTIONS_ED_ALL_ED_FT
Please follow up with orthopedics and your primary care doctor in 1-7 days   Please be aware of any new or worsening signs or symptoms that should prompt your return to the ER.      Knee Pain    Knee pain is a very common symptom and can have many causes. Knee pain often goes away when you follow your health care provider's instructions for relieving pain and discomfort at home. However, knee pain can develop into a condition that needs treatment. Some conditions may include:     Arthritis caused by wear and tear (osteoarthritis).  Arthritis caused by swelling and irritation (rheumatoid arthritis or gout).  A cyst or growth in your knee.  An infection in your knee joint.  An injury that will not heal.  Damage, swelling, or irritation of the tissues that support your knee (torn ligaments or tendinitis).    If your knee pain continues, additional tests may be ordered to diagnose your condition. Tests may include X-rays or other imaging studies of your knee. You may also need to have fluid removed from your knee. Treatment for ongoing knee pain depends on the cause, but treatment may include:    Medicines to relieve pain or swelling.  Steroid injections in your knee.  Physical therapy.  Surgery.    HOME CARE INSTRUCTIONS  Take medicines only as directed by your health care provider.   Rest your knee and keep it raised (elevated) while you are resting.  Do not do things that cause or worsen pain.  Avoid high-impact activities or exercises, such as running, jumping rope, or doing jumping jacks.  Apply ice to the knee area:  Put ice in a plastic bag.  Place a towel between your skin and the bag.  Leave the ice on for 20 minutes, 2–3 times a day.  Ask your health care provider if you should wear an elastic knee support.  Keep a pillow under your knee when you sleep.  Lose weight if you are overweight. Extra weight can put pressure on your knee.  Do not use any tobacco products, including cigarettes, chewing tobacco, or electronic cigarettes. If you need help quitting, ask your health care provider. Smoking may slow the healing of any bone and joint problems that you may have.    SEEK MEDICAL CARE IF:  Your knee pain continues, changes, or gets worse.  You have a fever along with knee pain.  Your knee donato or locks up.  Your knee becomes more swollen.    SEEK IMMEDIATE MEDICAL CARE IF:  Your knee joint feels hot to the touch.  You have chest pain or trouble breathing.

## 2021-02-15 NOTE — ED PROVIDER NOTE - PHYSICAL EXAMINATION
CONSTITUTIONAL: Well-developed; well-nourished; in no acute distress, nontoxic appearing  SKIN: skin exam is warm and dry,  HEAD: Normocephalic; atraumatic.  EXT: RLE: TTP overlying R patella w/ overlying swelling, FROM, steady gait.   NEURO: awake, alert, following commands, oriented, grossly unremarkable. No Focal deficits. GCS 15.   PSYCH: Cooperative, appropriate.

## 2021-02-15 NOTE — ED PROVIDER NOTE - NSFOLLOWUPCLINICS_GEN_ALL_ED_FT
[facility]  Orthpedic  [address], [place]   Phone: [phone]  Fax:   Follow Up Time: 1-3 Days

## 2021-02-15 NOTE — ED PROVIDER NOTE - NS_EDPROVIDERDISPOUSERTYPE_ED_A_ED
July 3, 2020      Rik Said  [address]  [place] [address]        Dear MsBasiaSaid,    I have reviewed your recent labs. Here are the results:   All labs are normal, kidney functions are normal   -Liver and gallbladder tests are normal (ALT,AST, Alk phos, bilirubin), kidney function is normal (Cr, GFR), sodium is normal, potassium is normal, calcium is normal, glucose is normal.       Nicola Lawrence MD   7/3/2020     Resulted Orders   UA with Microscopic reflex to Culture   Result Value Ref Range    Color Urine Yellow     Appearance Urine Clear     Glucose Urine Negative NEG^Negative mg/dL    Bilirubin Urine Negative NEG^Negative    Ketones Urine Negative NEG^Negative mg/dL    Specific Gravity Urine 1.010 1.003 - 1.035    pH Urine 7.0 5.0 - 7.0 pH    Protein Albumin Urine Negative NEG^Negative mg/dL    Urobilinogen Urine 0.2 0.2 - 1.0 EU/dL    Nitrite Urine Negative NEG^Negative    Blood Urine Negative NEG^Negative    Leukocyte Esterase Urine Negative NEG^Negative    Source Midstream Urine     WBC Urine 0 - 5 OTO5^0 - 5 /HPF    RBC Urine O - 2 OTO2^O - 2 /HPF    Squamous Epithelial /LPF Urine Few FEW^Few /LPF    Bacteria Urine Few (A) NEG^Negative /HPF   Comprehensive metabolic panel   Result Value Ref Range    Sodium 136 133 - 144 mmol/L    Potassium 4.2 3.4 - 5.3 mmol/L    Chloride 106 94 - 109 mmol/L    Carbon Dioxide 25 20 - 32 mmol/L    Anion Gap 5 3 - 14 mmol/L    Glucose 102 (H) 70 - 99 mg/dL    Urea Nitrogen 8 7 - 30 mg/dL    Creatinine 0.55 0.52 - 1.04 mg/dL    GFR Estimate >90 >60 mL/min/[1.73_m2]      Comment:      Non  GFR Calc  Starting 12/18/2018, serum creatinine based estimated GFR (eGFR) will be   calculated using the Chronic Kidney Disease Epidemiology Collaboration   (CKD-EPI) equation.      GFR Estimate If Black >90 >60 mL/min/[1.73_m2]      Comment:       GFR Calc  Starting 12/18/2018, serum creatinine based estimated GFR (eGFR) will be   calculated using  the Chronic Kidney Disease Epidemiology Collaboration   (CKD-EPI) equation.      Calcium 9.3 8.5 - 10.1 mg/dL    Bilirubin Total 0.6 0.2 - 1.3 mg/dL    Albumin 3.5 3.4 - 5.0 g/dL    Protein Total 7.3 6.8 - 8.8 g/dL    Alkaline Phosphatase 69 40 - 150 U/L    ALT 21 0 - 50 U/L    AST 16 0 - 45 U/L       If you have any questions or concerns, please call the clinic at the number listed above.       Sincerely,        Nicola Lawrence MD                 Attending Attestation (For Attendings USE Only)...

## 2021-02-15 NOTE — ED PROVIDER NOTE - OBJECTIVE STATEMENT
40 year old female, pmh schizophrenia, who presents with L knee pain. Patient states he was holding laundry when he slipped on water, falling onto L knee. Patient reports swelling since and pain w/ ambulation. no hip pain, skin changes, paresthesias/numbness/weakness.

## 2021-02-15 NOTE — ED PROVIDER NOTE - PATIENT PORTAL LINK FT
You can access the FollowMyHealth Patient Portal offered by [facility] by registering at the following website: [email or web address]. By joining [employer]’s FollowMyHealth portal, you will also be able to view your health information using other applications (apps) compatible with our system.

## 2021-02-24 PROBLEM — M25.562 PAIN OF LEFT KNEE AFTER INJURY: Status: ACTIVE | Noted: 2021-01-01

## 2021-05-28 NOTE — H&P PST ADULT - HISTORY OF PRESENT ILLNESS
39 y/o female presents to PAST in preparation for robotic/laparoscopic sleeve gastrectomy with Dr. way in [facility] under [place] on 21    Pt states that she has tried diet and excersie without much success with weightloss. Pt would like above procedure for weight loss.  Pt has a hx of asthma, last exacerbation 6 months ago. Pt is followed by pulmonologist Dr. Beavers.   Pt states that she had 2 seizures following being [place] in the [place] 1 1/2 yrs ago. Pt states that she has not had any seizures since the trauma and has not followed up with neurology.   PATIENT CURRENTLY DENIES CHEST PAIN  SHORTNESS OF BREATH  PALPITATIONS,  DYSURIA, OR UPPER RESPIRATORY INFECTION IN PAST 2 WEEKS  EXERCISE  TOLERANCE  2-3 FLIGHT OF STAIRS  WITHOUT SHORTNESS OF BREATH  pt denies any covid s/s, or tested positive in the past  pt advised self quarantine till day of procedure  Patient verbalized understanding of instructions and was given the opportunity to ask questions and have them answered.   written and verbal instructions with teach back on chlorhexidine shampoo provided,  pt verbalized understanding with returned demonstration    Anesthesia Alert  NO--Difficult Airway  NO--History of neck surgery or radiation  NO--Limited ROM of neck  NO--History of Malignant hyperthermia  NO--Personal or family history of Pseudocholinesterase deficiency.  NO--Prior Anesthesia Complication  NO--Latex Allergy  NO--Loose teeth  NO--History of Rheumatoid Arthritis  Yes--KEZIA, on cpap  NO--Bleeding risk  NO--Other_____    E66./ 69650    Morbid or severe obesity due to excess calories    Encounter for other preprocedural examination    ^E66.01/ 29695    Morbid or severe obesity due to excess calories    Encounter for other preprocedural examination    Schizophrenia    Asthma    Seizure    History of appendectomy

## 2021-05-28 NOTE — H&P PST ADULT - NSICDXPASTMEDICALHX_GEN_ALL_CORE_FT
PAST MEDICAL HISTORY:  Asthma     Seizure last seizure 1 1/2 yrs ago    Severe obesity (BMI >= 40)

## 2021-05-28 NOTE — H&P PST ADULT - REASON FOR ADMISSION
39 y/o female presents to PAST in preparation for robotic/laparoscopic sleeve gastrectomy with Dr. way in [facility] under [place] on 6/14/21

## 2021-06-11 NOTE — ASSESSMENT
[FreeTextEntry1] : 41yo female with history of asthma and morbid obesity BMI 48 seen today for Preoperative Bariatric follow up visit. \par -evaluations by cardiology, gastroenterology, pulmonology, psychology and medicine have been reviewed\par -consent obtained for ROBOTIC/LAPAROSCOPIC SLEEVE GASTRECTOMY, POSSIBLE OPEN, POSSIBLE INTRA-OPERATIVE ENDOSCOPY AND ALL INDICATED PROCEDURES\par -post-operative instructions reviewed - diet, wound care, concerning symptoms\par -All medications were reviewed with the patient and instructions were given in respect to medications on the day of surgery. Written instructions provided.\par -Rx sent for Gabapentin 250 MG/5ML Oral Solution; TAKE 2.5 ML Every 8 hours\par -Rx sent for Pantoprazole Sodium 40mg PO delayed release daily\par -return to office post-operatively as scheduled\par -call with concerns

## 2021-06-11 NOTE — HISTORY OF PRESENT ILLNESS
[de-identified] : 41yo female with PMHx of asthma, KEZIA class III morbid obesity who entered the [facility] bariatric program 9/2020. She underwent evaluation by Cardiology, Pulmonology, Gastroenterology, Psychiatry, and her PCP. She underwent supervised dieting with nutritionist at [facility]. At this time, she has been continuing to prepare for surgery.

## 2021-06-11 NOTE — CONSULT LETTER
[Dear  ___] : Dear  [unfilled], [Courtesy Letter:] : I had the pleasure of seeing your patient, [unfilled], in my office today. [Please see my note below.] : Please see my note below. [Sincerely,] : Sincerely, [FreeTextEntry3] : Ely Lopez MD FACS\par Bariatric & Minimally Invasive Surgery\par [facility]\par [phone]

## 2021-06-11 NOTE — PHYSICAL EXAM
[Obese, well nourished, in no acute distress] : obese, well nourished, in no acute distress [Normal] : affect appropriate [de-identified] : mcburney scar [de-identified] : no CVA tenderness B/L

## 2021-06-14 NOTE — BRIEF OPERATIVE NOTE - NSICDXBRIEFPROCEDURE_GEN_ALL_CORE_FT
PROCEDURES:  Robot-assisted sleeve gastrectomy 14-Jun-2021 10:53:07  Jerilyn Fernandez, transversus abdominis plane, bilateral 14-Jun-2021 10:53:15  Jerilyn Fernandez

## 2021-06-14 NOTE — CONSULT NOTE ADULT - ASSESSMENT
Assessment & Plan    40y Female  s/p lap sleeve gastrectomy w/ b/l TAP block    NEURO:    Acute pain-controlled with Tylenol, gabapentin, dilaudid PRN    RESP:     Oxygen insufficiency-wean off NC to RA as tolerate    hx of asthma-no recent exacerbation as per patient    Activity-increase as tolerated         CARDS:     no     Imaging:     Labs:       GI/NUTR:     Diet, Clear Liquid      GI Prophylaxis-    Bowel regimen-  pantoprazole  Injectable 40 milliGRAM(s) IV Push daily      /RENAL:      Monitor UO-shannon in place  Volume Status:  Labs:          BUN/Cr-          Electrolytes-      HEME/ONC:       DVT prophylaxis-heparin   Injectable  , SCDs    Labs: Hb/Hct:                       Plts:                  PTT/INR:                  T&S:    ID:  WBC-  Temp trend- 24hrs T(F): 98.5 (06-14 @ 10:55), Max: 98.7 (06-14 @ 07:09)  Antibiotics-clindamycin IVPB 600 every 8 hours    Cultures:           ENDO:         HA1C     LINES/DRAINS:  PIV,    DISPO:    SDU d/w Dr. Murguia and Dr. Leyva Assessment & Plan    40y Female  s/p lap sleeve gastrectomy w/ b/l TAP block    NEURO:    Acute pain-controlled with Tylenol, gabapentin, dilaudid PRN    RESP:     Oxygen insufficiency-wean off NC to RA as tolerate    hx of asthma-no recent exacerbation as per patient    Activity-increase as tolerated         CARDS:     no PMH    post op EKG NSR,     Imaging:     Labs:       GI/NUTR:     Diet, Clear Liquid      GI Prophylaxis-    Bowel regimen-  pantoprazole  Injectable 40 milliGRAM(s) IV Push daily      /RENAL:      Monitor UO-shannon in place  Volume Status:  Labs:          BUN/Cr-          Electrolytes-      HEME/ONC:       DVT prophylaxis-heparin   Injectable  , SCDs    Labs: Hb/Hct:                       Plts:                  PTT/INR:                  T&S:    ID:  WBC-  Temp trend- 24hrs T(F): 98.5 (06-14 @ 10:55), Max: 98.7 (06-14 @ 07:09)  Antibiotics-clindamycin IVPB 600 every 8 hours    Cultures:           ENDO:         HA1C     LINES/DRAINS:  PIV,    DISPO:    SDU d/w Dr. Murguia and Dr. Leyva Assessment & Plan    40y Female  s/p lap sleeve gastrectomy w/ b/l TAP block    NEURO:    Acute pain-controlled with Tylenol, gabapentin, dilaudid PRN    RESP:     Oxygen insufficiency-wean off NC to RA as tolerate    hx of asthma-no recent exacerbation as per patient    Activity-increase as tolerated       CARDS:     no PMH    post op EKG NSR,     GI/NUTR:     Diet-bariatric protocol    GI Prophylaxis-PPI    /RENAL:      Monitor UO-TOV at 4pm    Labs at 4pm    HEME/ONC:       DVT prophylaxis-heparin   Injectable  Labs at 4pm    ID:  WBC-Temp trend- 24hrs T(F): 98.5 (06-14 @ 10:55), Max: 98.7 (06-14 @ 07:09)  Antibiotics-clindamycin IVPB 600 every 8 hours    ENDO:    FSG q6    LINES/DRAINS:  PIV,    DISPO:    SDU d/w Dr. Murguia and Dr. Leyva

## 2021-06-14 NOTE — CHART NOTE - NSCHARTNOTEFT_GEN_A_CORE
Called to bedside by RN for patient refusing blood work during phlebotomy rounds and refusal to document/follow bariatric protocol diet. Patient educated that clinical management can change depending on lab abnormalities. Patient advised that following and documenting the postop bariatric protocol is critical. Patient continues to refuse to lab work and will not engage in conversation with RN/PA when approached for education. Provider will continue to encourage patient. Primary team made aware of events approx 1843.

## 2021-06-14 NOTE — CHART NOTE - NSCHARTNOTEFT_GEN_A_CORE
PACU ANESTHESIA ADMISSION NOTE      Procedure: Robot-assisted sleeve gastrectomy    Block, transversus abdominis plane, bilateral      Post op diagnosis:  Morbid obesity        __x__  Patent Airway    _x___  Full return of protective reflexes    _x___  Full recovery from anesthesia / back to baseline     Vitals:   T:98.5           R:  19                BP: 146/98                 Sat:  100%                 P: 89      Mental Status:  __x__ Awake   _____ Alert   _____ Drowsy   _____ Sedated    Nausea/Vomiting:  ____ NO  ______Yes,   See Post - Op Orders          Pain Scale (0-10):  _____    Treatment: ____ None    ___x_ See Post - Op/PCA Orders    Post - Operative Fluids:   ____ Oral   __x__ See Post - Op Orders    Plan: Discharge:   ____Home       __x___Floor     _____Critical Care    _____  Other:_________________    Comments: Pt tolerated procedure well, no anesthesia related complications. Care of pt endorsed to PACU, report given to PACU RN. Discharge when criteria are met. PACU ANESTHESIA ADMISSION NOTE      Procedure: Robot-assisted sleeve gastrectomy    Block, transversus abdominis plane, bilateral      Post op diagnosis:  Morbid obesity        __x__  Patent Airway    _x___  Full return of protective reflexes    _x___  Full recovery from anesthesia / back to baseline     Vitals:   T:98.5           R:  19                BP: 146/98                 Sat:  100%                 P: 89      Mental Status:  __x__ Awake   _____ Alert   _____ Drowsy   _____ Sedated    Nausea/Vomiting:  ____ NO  ______Yes,   See Post - Op Orders          Pain Scale (0-10):  _____    Treatment: ____ None    ___x_ See Post - Op/PCA Orders    Post - Operative Fluids:   ____ Oral   __x__ See Post - Op Orders    Plan: Discharge:   ____Home       __x___Floor     _____Critical Care    _____  Other:_________________    Comments: Pt tolerated procedure well, no anesthesia related complications. Care of pt endorsed to PACU, report given to PACU RN. Discharge when criteria are met. ICU sign out given to Resident at #[phone]

## 2021-06-14 NOTE — CHART NOTE - NSCHARTNOTEFT_GEN_A_CORE
Post Operative Note  Patient: EDITH PURDY 40y (1980) Female   MRN: [id number]  Location: [facility] 006 A  Visit: 06-14-21 Inpatient  Date: 06-14-21 @ 19:01    Procedure: Morbid obesity     S/P Robot-assisted sleeve gastrectomy    Block, transversus abdominis plane, bilateral        Subjective:   Nausea: yes, Vomiting: no, Ambulating: no, Flatus: no, +burping  Pain Assessment: Patient is complaining of pain that is appropriate for post-operative course.     Objective:  Vitals: T(F): 96.7 (06-14-21 @ 14:34), Max: 98.7 (06-14-21 @ 07:09)  HR: 81 (06-14-21 @ 14:34)  BP: 142/69 (06-14-21 @ 14:34) (121/73 - 156/77)  RR: 18 (06-14-21 @ 14:34)  SpO2: 98% (06-14-21 @ 18:15)  Vent Settings:     In:   06-14-21 @ 07:01  -  06-14-21 @ 19:01  --------------------------------------------------------  IN: 100 mL      IV Fluids: lactated ringers. 1000 milliLiter(s) (100 mL/Hr) IV Continuous <Continuous>      Out:   06-14-21 @ 07:01  -  06-14-21 @ 19:01  --------------------------------------------------------  OUT: 250 mL      EBL:     Voided Urine:   06-14-21 @ 07:01  -  06-14-21 @ 19:01  --------------------------------------------------------  OUT: 250 mL      Cotton Catheter: no      Physical Examination:  General Appearance: NAD, sleepy, minimal verbal interaction  HEENT: EOMI, sclera non-icteric.  Heart: RRR  Lungs: CTABL  Abdomen:  Soft, obese, tender, appropriate for post-op course, nondistended. No rigidity, guarding, or rebound tenderness.   MSK/Extremities: Warm & well-perfused.  Skin: Warm, dry. No jaundice.   Incisions/Wounds: Dressings in place, clean, dry and intact, no signs of infection/active bleeding/drainage    Medications: [Standing]  acetaminophen  IVPB .. 1000 milliGRAM(s) IV Intermittent once  ALBUTerol    90 MICROgram(s) HFA Inhaler 2 Puff(s) Inhalation every 6 hours PRN  clindamycin IVPB 600 milliGRAM(s) IV Intermittent every 8 hours  heparin   Injectable 5000 Unit(s) SubCutaneous every 8 hours  HYDROmorphone  Injectable 0.25 milliGRAM(s) IV Push every 6 hours PRN  ketorolac   Injectable 15 milliGRAM(s) IV Push every 6 hours PRN  lactated ringers. 1000 milliLiter(s) IV Continuous <Continuous>  pantoprazole  Injectable 40 milliGRAM(s) IV Push daily  prochlorperazine   Injectable 5 milliGRAM(s) IV Push every 6 hours PRN    Medications: [PRN]  acetaminophen  IVPB .. 1000 milliGRAM(s) IV Intermittent once  ALBUTerol    90 MICROgram(s) HFA Inhaler 2 Puff(s) Inhalation every 6 hours PRN  clindamycin IVPB 600 milliGRAM(s) IV Intermittent every 8 hours  heparin   Injectable 5000 Unit(s) SubCutaneous every 8 hours  HYDROmorphone  Injectable 0.25 milliGRAM(s) IV Push every 6 hours PRN  ketorolac   Injectable 15 milliGRAM(s) IV Push every 6 hours PRN  lactated ringers. 1000 milliLiter(s) IV Continuous <Continuous>  pantoprazole  Injectable 40 milliGRAM(s) IV Push daily  prochlorperazine   Injectable 5 milliGRAM(s) IV Push every 6 hours PRN      DVT PROPHYLAXIS: heparin   Injectable 5000 Unit(s) SubCutaneous every 8 hours    GI PROPHYLAXIS: pantoprazole  Injectable 40 milliGRAM(s) IV Push daily     ANTIBIOTICS:  clindamycin IVPB 600 milliGRAM(s)        Labs:  refusing...      Imaging:  No post-op imaging studies      Assessment:  40yF S/P Robot-assisted sleeve gastrectomy Block, transversus abdominis plane, bilateral. burping, nauseous, and having some sips. unclear how much patient is taking in as she refuses to document    Plan:  - pt refusing labs and refusing to document her cups of clears   - Monitor vitals  - Monitor post-op labs and replete as necessary  - Monitor for bowel function  - Continue Pain Medications if necessary  - Continue Antibiotics if necessary  - Encourage ambulation as tolerated  - Monitor urine output and trial of void once Cotton removed  - DVT and GI Prophylaxis  - Monitor wound and dressing for changes, redress as needed.      Date/Time: 06-14-21 @ 19:01
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
## 2021-06-15 NOTE — PROGRESS NOTE ADULT - SUBJECTIVE AND OBJECTIVE BOX
GENERAL SURGERY PROGRESS NOTE    Patient: EDITH PURDY , 40y ([birth year])Female   MRN: [id number]  Location: [address]  Visit: 21 Inpatient  Date: 06-15-21 @ 09:47      Hospital Day #: 2d  Post-Op Day #: 1d    Procedure: Robot-assisted sleeve gastrectomy    Block, transversus abdominis plane, bilateral      Dx/Injuries: Morbid obesity    Morbid obesity      24HRS EVENT:    Night  -OOB  -Voiding    DAY  -labs at 4pm  -TOV at 4pm (voided 250)  -refusing labs and ambulation     Events of past 24 hours: Patient seen and examined at bedside. No acute events overnight. Afebrile, VSS.    PAST MEDICAL & SURGICAL HISTORY:  Asthma  Seizure  last seizure 1 1/2 yrs ago  Severe obesity (BMI &gt;= 40)  History of appendectomy          Vitals:   T(F): 98.2 (06-15-21 @ 08:00), Max: 98.5 (21 @ 10:55)  HR: 78 (06-15-21 @ 08:00)  BP: 149/86 (06-15-21 @ 08:00)  RR: 20 (06-15-21 @ 08:00)  SpO2: 98% (06-15-21 @ 08:00)      Diet, Clear Liquid:   Bariatric Clear Liquid (BARICLLIQ)      Fluids: lactated ringers.: Solution, 1000 milliLiter(s) infuse at 100 mL/Hr      I & O's:    21 @ 07:01  -  06-15-21 @ 07:00  --------------------------------------------------------  IN:    Lactated Ringers: 100 mL  Total IN: 100 mL    OUT:    Voided (mL): 1154 mL  Total OUT: 1154 mL    Total NET: -1054 mL        Bowel Movement: : [] YES [x] NO  Flatus: : [x] YES [] NO    PHYSICAL EXAM:  General Appearance: NAD  HEENT: EOMI, sclera non-icteric.  Heart: RRR   Lungs: No increased work of breathing or accessory muscle use. Symmetric chest wall rise and fall.   Abdomen:  Soft, obese, nontender, nondistended.   MSK/Extremities: Warm & well-perfused.   Skin: Warm, dry. No jaundice.   Incision/wound: healing well, dressings in place, clean, dry and intact    MEDICATIONS  (STANDING):  acetaminophen  IVPB .. 1000 milliGRAM(s) IV Intermittent once  gabapentin   Solution 125 milliGRAM(s) Oral three times a day  heparin   Injectable 5000 Unit(s) SubCutaneous every 8 hours  lactated ringers. 1000 milliLiter(s) (100 mL/Hr) IV Continuous <Continuous>  pantoprazole  Injectable 40 milliGRAM(s) IV Push daily    MEDICATIONS  (PRN):  ALBUTerol    90 MICROgram(s) HFA Inhaler 2 Puff(s) Inhalation every 6 hours PRN Wheezing  HYDROmorphone  Injectable 0.25 milliGRAM(s) IV Push every 6 hours PRN Severe Pain (7 - 10)  ketorolac   Injectable 15 milliGRAM(s) IV Push every 6 hours PRN Moderate Pain (4 - 6)  prochlorperazine   Injectable 5 milliGRAM(s) IV Push every 6 hours PRN nausea/emesis      DVT PROPHYLAXIS: heparin   Injectable 5000 Unit(s) SubCutaneous every 8 hours    GI PROPHYLAXIS: pantoprazole  Injectable 40 milliGRAM(s) IV Push daily    Labs:               10.7   13.02 )-----------( 312      ( 15 Eric 2021 01:18 )             35.0         06-15    138  |  102  |  6<L>  ----------------------------<  111<H>  4.3   |  28  |  0.5<L>      Calcium, Total Serum: 9.1 mg/dL (06-15-21 @ 01:18)        ACCESS/ DEVICES:  Peripheral IV

## 2021-06-15 NOTE — PROGRESS NOTE ADULT - ATTENDING COMMENTS
41yo female with morbid obesity POD#1 s/p robotic sleeve gastrectomy. Reported occasional abdominal discomfort but pain is minimal. Started bariatric clear liquid diet last night and tolerated, advance this morning. Ambulating well. Using incentive spirometer. Continue DVT ppx with heparin, incentive spirometry, and GI ppx. Discharge home today if tolerating kaylie CLD.

## 2021-06-15 NOTE — PROGRESS NOTE ADULT - SUBJECTIVE AND OBJECTIVE BOX
EDITH PURDY  [id number]  40y Female    Indication for ICU admission: sleeve gastrectomy  Admit Date:21  ICU Date:   OR Date:     Originally Entered as [Unknown] reaction to [DIMOTREX] (Unknown)  penicillin (Anaphylaxis)  Zithromax (Unknown)    PAST MEDICAL & SURGICAL HISTORY:  Asthma    Seizure  last seizure 1 1/2 yrs ago    Severe obesity (BMI &gt;= 40)    History of appendectomy          Home Medications:        24HRS EVENT:    Night  -OOB  -Voiding    DAY  -labs at 4pm  -TOV at 4pm (voided 250)  -refusing labs and ambulation           DVT Prophylaxis: heparin   Injectable 5000 Unit(s) SubCutaneous every 8 hours      GI Prophylaxis:pantoprazole  Injectable 40 milliGRAM(s) IV Push daily      ***Tubes/Lines/Drains  ***  Peripheral IV  Arterial Line		                  Central Line                            Urinary Catheter		Indication: Strict I&O          [X] A ten-point review of systems was otherwise negative except as noted above.  [  ] Due to altered mental status/intubation, subjective information was not attained from the patient. History was obtained, to the extent possible, from review of the chart and collateral sources of information.       EDITH PURDY  [id number]  40y Female    Indication for ICU admission: sleeve gastrectomy  Admit Date:21  ICU Date:   OR Date:     Originally Entered as [Unknown] reaction to [DIMOTREX] (Unknown)  penicillin (Anaphylaxis)  Zithromax (Unknown)    PAST MEDICAL & SURGICAL HISTORY:  Asthma    Seizure  last seizure 1 1/2 yrs ago    Severe obesity (BMI &gt;= 40)    History of appendectomy          Home Medications:        24HRS EVENT:    Night  -OOB  -Voiding    DAY  -labs at 4pm  -TOV at 4pm (voided 250)  -refusing labs and ambulation           DVT Prophylaxis: heparin   Injectable 5000 Unit(s) SubCutaneous every 8 hours      GI Prophylaxis:pantoprazole  Injectable 40 milliGRAM(s) IV Push daily      ***Tubes/Lines/Drains  ***  Peripheral IV  Arterial Line		                  Central Line                            Urinary Catheter		Indication: Strict I&O          [X] A ten-point review of systems was otherwise negative except as noted above.  [  ] Due to altered mental status/intubation, subjective information was not attained from the patient. History was obtained, to the extent possible, from review of the chart and collateral sources of information.    Daily     Daily     Diet, Clear Liquid:   Bariatric Clear Liquid (BARICLLIQ) (21 @ 11:05)      CURRENT MEDS:  Neurologic Medications  acetaminophen  IVPB .. 1000 milliGRAM(s) IV Intermittent once  gabapentin   Solution 125 milliGRAM(s) Oral three times a day  HYDROmorphone  Injectable 0.25 milliGRAM(s) IV Push every 6 hours PRN Severe Pain (7 - 10)  ketorolac   Injectable 15 milliGRAM(s) IV Push every 6 hours PRN Moderate Pain (4 - 6)  prochlorperazine   Injectable 5 milliGRAM(s) IV Push every 6 hours PRN nausea/emesis    Respiratory Medications  ALBUTerol    90 MICROgram(s) HFA Inhaler 2 Puff(s) Inhalation every 6 hours PRN Wheezing    Cardiovascular Medications    Gastrointestinal Medications  lactated ringers. 1000 milliLiter(s) IV Continuous <Continuous>  pantoprazole  Injectable 40 milliGRAM(s) IV Push daily    Genitourinary Medications    Hematologic/Oncologic Medications  heparin   Injectable 5000 Unit(s) SubCutaneous every 8 hours    Antimicrobial/Immunologic Medications    Endocrine/Metabolic Medications    Topical/Other Medications      ICU Vital Signs Last 24 Hrs  T(C): 36.8 (15 Eric 2021 05:59), Max: 36.9 (2021 10:55)  T(F): 98.3 (15 Eric 2021 05:59), Max: 98.5 (2021 10:55)  HR: 76 (15 Eric 2021 05:59) (76 - 90)  BP: 138/63 (15 Eric 2021 05:59) (124/62 - 156/77)  BP(mean): 91 (15 Eric 2021 05:59) (91 - 104)  ABP: --  ABP(mean): --  RR: 20 (15 Eric 2021 05:59) (16 - 20)  SpO2: 95% (15 Eric 2021 05:59) (95% - 100%)      Adult Advanced Hemodynamics Last 24 Hrs  CVP(mm Hg): --  CVP(cm H2O): --  CO: --  CI: --  PA: --  PA(mean): --  PCWP: --  SVR: --  SVRI: --  PVR: --  PVRI: --          I&O's Summary    2021 07:01  -  15 Eric 2021 07:00  --------------------------------------------------------  IN: 100 mL / OUT: 1154 mL / NET: -1054 mL      I&O's Detail    2021 07:01  -  15 Eric 2021 07:00  --------------------------------------------------------  IN:    Lactated Ringers: 100 mL  Total IN: 100 mL    OUT:    Voided (mL): 1154 mL  Total OUT: 1154 mL    Total NET: -1054 mL          PHYSICAL EXAM:    General/Neuro  RASS:             GCS:     = E   / V   / M      Deficits:                             alert & oriented x 3, no focal deficits  Pupils:    Lungs:      clear to auscultation, Normal expansion/effort.     Cardiovascular : S1, S2.  Regular rate and rhythm.  Peripheral edema   Cardiac Rhythm: Normal Sinus Rhythm    GI: Abdomen soft, Non-tender, Non-distended.    Gastrostomy / Jejunostomy tube in place.  Nasogastric tube in place.  Colostomy / Ileostomy.    Wound:    Extremities: Extremities warm, pink, well-perfused. Pulses:Rt     Lt    Derm: Good skin turgor, no skin breakdown.      :       Cotton catheter in place.      CXR:     LABS:  CAPILLARY BLOOD GLUCOSE                              10.7   13.02 )-----------( 312      ( 15 Eric 2021 01:18 )             35.0       06-15    138  |  102  |  6<L>  ----------------------------<  111<H>  4.3   |  28  |  0.5<L>    Ca    9.1      15 Eric 2021 01:18  Phos  3.3     06-15  Mg     1.9     06-15                   EDITH PURDY  [id number]  40y Female    Indication for ICU admission: sleeve gastrectomy  Admit Date:21  ICU Date:   OR Date:     Originally Entered as [Unknown] reaction to [DIMOTREX] (Unknown)  penicillin (Anaphylaxis)  Zithromax (Unknown)    PAST MEDICAL & SURGICAL HISTORY:  Asthma    Seizure  last seizure 1 1/2 yrs ago    Severe obesity (BMI &gt;= 40)    History of appendectomy          Home Medications:        24HRS EVENT:    Night  -OOB  -Voiding    DAY  -labs at 4pm  -TOV at 4pm (voided 250)  -refusing labs and ambulation           DVT Prophylaxis: heparin   Injectable 5000 Unit(s) SubCutaneous every 8 hours      GI Prophylaxis:pantoprazole  Injectable 40 milliGRAM(s) IV Push daily      ***Tubes/Lines/Drains  ***  Peripheral IV  Arterial Line		                  Central Line                            Urinary Catheter		Indication: Strict I&O          [X] A ten-point review of systems was otherwise negative except as noted above.  [  ] Due to altered mental status/intubation, subjective information was not attained from the patient. History was obtained, to the extent possible, from review of the chart and collateral sources of information.    Daily     Daily     Diet, Clear Liquid:   Bariatric Clear Liquid (BARICLLIQ) (21 @ 11:05)      CURRENT MEDS:  Neurologic Medications  acetaminophen  IVPB .. 1000 milliGRAM(s) IV Intermittent once  gabapentin   Solution 125 milliGRAM(s) Oral three times a day  HYDROmorphone  Injectable 0.25 milliGRAM(s) IV Push every 6 hours PRN Severe Pain (7 - 10)  ketorolac   Injectable 15 milliGRAM(s) IV Push every 6 hours PRN Moderate Pain (4 - 6)  prochlorperazine   Injectable 5 milliGRAM(s) IV Push every 6 hours PRN nausea/emesis    Respiratory Medications  ALBUTerol    90 MICROgram(s) HFA Inhaler 2 Puff(s) Inhalation every 6 hours PRN Wheezing    Cardiovascular Medications    Gastrointestinal Medications  lactated ringers. 1000 milliLiter(s) IV Continuous <Continuous>  pantoprazole  Injectable 40 milliGRAM(s) IV Push daily    Genitourinary Medications    Hematologic/Oncologic Medications  heparin   Injectable 5000 Unit(s) SubCutaneous every 8 hours    Antimicrobial/Immunologic Medications    Endocrine/Metabolic Medications    Topical/Other Medications      ICU Vital Signs Last 24 Hrs  T(C): 36.8 (15 Eric 2021 05:59), Max: 36.9 (2021 10:55)  T(F): 98.3 (15 Eric 2021 05:59), Max: 98.5 (2021 10:55)  HR: 76 (15 Eric 2021 05:59) (76 - 90)  BP: 138/63 (15 Eric 2021 05:59) (124/62 - 156/77)  BP(mean): 91 (15 Eric 2021 05:59) (91 - 104)  ABP: --  ABP(mean): --  RR: 20 (15 Eric 2021 05:59) (16 - 20)  SpO2: 95% (15 Eric 2021 05:59) (95% - 100%)      Adult Advanced Hemodynamics Last 24 Hrs  CVP(mm Hg): --  CVP(cm H2O): --  CO: --  CI: --  PA: --  PA(mean): --  PCWP: --  SVR: --  SVRI: --  PVR: --  PVRI: --          I&O's Summary    2021 07:01  -  15 Eric 2021 07:00  --------------------------------------------------------  IN: 100 mL / OUT: 1154 mL / NET: -1054 mL      I&O's Detail    2021 07:01  -  15 Eric 2021 07:00  --------------------------------------------------------  IN:    Lactated Ringers: 100 mL  Total IN: 100 mL    OUT:    Voided (mL): 1154 mL  Total OUT: 1154 mL    Total NET: -1054 mL          PHYSICAL EXAM:    General/Neuro  GCS:     = 15  Deficits:                             alert & oriented x 3, no focal deficits    Lungs:      clear to auscultation, Normal expansion/effort.     Cardiovascular : S1, S2.  Regular rate and rhythm.   Cardiac Rhythm: Normal Sinus Rhythm    GI: Abdomen soft, Non-tender, Non-distended.      Extremities: Extremities warm, pink, well-perfused.     Derm: Good skin turgor, no skin breakdown.      :      no  Cotton catheter in place. voiding      CXR:     LABS:  CAPILLARY BLOOD GLUCOSE                              10.7   13.02 )-----------( 312      ( 15 Eric 2021 01:18 )             35.0       06-15    138  |  102  |  6<L>  ----------------------------<  111<H>  4.3   |  28  |  0.5<L>    Ca    9.1      15 Eric 2021 01:18  Phos  3.3     06-15  Mg     1.9     06-15

## 2021-06-15 NOTE — PROGRESS NOTE ADULT - ASSESSMENT
ASSESSMENT & PLAN  40y Female  s/p lap sleeve gastrectomy w/ b/l TAP block    NEURO:    Acute pain-controlled with Tylenol, Toradol, gabapentin, dilaudid PRN    RESP:     Saturating well on RA    hx of asthma-no recent exacerbation as per patient    Activity-increase as tolerated       CARDS:     no PMH    post op EKG NSR,     GI/NUTR:     Diet-bariatric protocol tolerating 1oz    GI Prophylaxis-PPI    /RENAL:   - Monitor UO-voiding  - LR @ 100  - Monitor and replete electrolyes as needed  - Labs:          BUN/Cr- 7/0.6  -->          Electrolytes-Na 134 // K 4.4 // Mg 1.6 //  Phos 3.4 (06-14 @ 21:09)- hypomagnesemia repleted      HEME/ONC:   - DVT prophylaxis-heparin   Injectable  - Labs:  Hb/Hct:  10.6/34.7  -->  Plts:  341  -->    PTT/INR:   T&S:      ID:  -WBC: 12.4  -Temp trend- 24hrs T(F): 98.5 (06-14 @ 10:55), Max: 98.7 (06-14 @ 07:09)  -Antibiotics-clindamycin IVPB 600 every 8 hours    ENDO:    FSG q6    MSK:  -OOB, Ambulating    LINES/DRAINS:  PIV    DISPO:    SDU d/w Dr. Murguia and Dr. Leyva     ASSESSMENT & PLAN  40y Female  s/p lap sleeve gastrectomy w/ b/l TAP block    NEURO:    Acute pain-controlled with Tylenol, Toradol, gabapentin, dilaudid PRN    RESP:     Saturating well on RA    hx of asthma-no recent exacerbation as per patient    Activity-increase as tolerated       CARDS:     no PMH    post op EKG NSR,     GI/NUTR:     Diet-bariatric protocol tolerating 1oz    GI Prophylaxis-PPI    /RENAL:   - Monitor UO-voiding  - LR @ 100  - Monitor and replete electrolyes as needed  - Labs:          BUN/Cr- 7/0.6  -->,  6/0.5  -->          Electrolytes-Na 138 // K 4.3 // Mg 1.9 //  Phos 3.3 (06-15 @ 01:18)          HEME/ONC:   - DVT prophylaxis-heparin   Injectable  - Labs:  Hb/Hct:  10.6/34.7  -->,  10.7/35.0  -->  Plts:  341  -->,  312  -->    PTT/INR:   T&S:      ID:  -WBC: 12.4 --> 13.02  -Afebrile  -Antibiotics-clindamycin IVPB 600 every 8 hours    ENDO:    FSG q6    MSK:  -OOB, Ambulating    LINES/DRAINS:  PIV    DISPO:    SDU

## 2021-06-15 NOTE — CHART NOTE - NSCHARTNOTEFT_GEN_A_CORE
Despite multiple encouragement by nurse , patient refusing to follow bariatric protocol , primary team made aware.

## 2021-06-15 NOTE — PROGRESS NOTE ADULT - ASSESSMENT
ASSESSMENT:  40y Female  s/p lap sleeve gastrectomy w/ b/l TAP block  Patient seen and examined at bedside. NAD.   ambulating. Tolerating: Diet, Clear Liquid    PLAN:  - Monitor vitals  - Monitor labs and replete as necessary  - Monitor for bowel function  - Encourage ambulation as tolerated  - Monitor urine output  - DVT and GI Prophylaxis  - dispo planning      Lines/Tubes: PIV    Date/Time: 06-15-21 @ 09:47      [employer] [id number]

## 2021-06-16 NOTE — DISCHARGE NOTE PROVIDER - NSDCCPTREATMENT_GEN_ALL_CORE_FT
PRINCIPAL PROCEDURE  Procedure: Robot-assisted sleeve gastrectomy  Findings and Treatment: block transversus abdominis plane bilateral

## 2021-06-16 NOTE — CHART NOTE - NSCHARTNOTESELECT_GEN_ALL_CORE
[facility] protocol/Event Note
Refusal to Lab and Bariatric Protocol/Event Note
poc/Event Note
Event Note
PACU Admission/Event Note
post op/Event Note

## 2021-06-16 NOTE — PROGRESS NOTE ADULT - SUBJECTIVE AND OBJECTIVE BOX
EDITH PURDY  [id number]  40y Female    Indication for ICU admission: sleeve gastrectomy  Admit Date:21  ICU Date:   OR Date:     Originally Entered as [Unknown] reaction to [DIMOTREX] (Unknown)  penicillin (Anaphylaxis)  Zithromax (Unknown)    PAST MEDICAL & SURGICAL HISTORY:  Asthma    Seizure  last seizure 1 1/2 yrs ago    Severe obesity (BMI &gt;= 40)    History of appendectomy          Home Medications:        24HRS EVENT:  6/15  DAY   -DC when gets to 4 cups   - despite multiple encourgment by nurse , patient refusing to follow bariatric protocol , primary team made aware     6/15  Night  -vomited after 2oz, so could not be discharged home  -however later in a night drank 8oz apple juice, and did fine  -IP 2.1, K 3.7 --> 30mmol KPhos given      DVT Prophylaxis: heparin   Injectable 5000 Unit(s) SubCutaneous every 8 hours      GI Prophylaxis:pantoprazole  Injectable 40 milliGRAM(s) IV Push daily      ***Tubes/Lines/Drains  ***  Peripheral IV  Arterial Line		                  Central Line                            Urinary Catheter		Indication: Strict I&O          [X] A ten-point review of systems was otherwise negative except as noted above.  [  ] Due to altered mental status/intubation, subjective information was not attained from the patient. History was obtained, to the extent possible, from review of the chart and collateral sources of information.       EDITH PURDY  [id number]  40y Female    Indication for ICU admission: sleeve gastrectomy  Admit Date:21  ICU Date:   OR Date:     Originally Entered as [Unknown] reaction to [DIMOTREX] (Unknown)  penicillin (Anaphylaxis)  Zithromax (Unknown)    PAST MEDICAL & SURGICAL HISTORY:  Asthma    Seizure  last seizure 1 1/2 yrs ago    Severe obesity (BMI &gt;= 40)    History of appendectomy          Home Medications:        24HRS EVENT:  6/15  DAY   -DC when gets to 4 cups   - despite multiple encourgment by nurse , patient refusing to follow bariatric protocol , primary team made aware     6/15  Night  -vomited after 2oz, so could not be discharged home  -however later in a night drank 8oz apple juice, and did fine  -IP 2.1, K 3.7 --> 30mmol KPhos given      DVT Prophylaxis: heparin   Injectable 5000 Unit(s) SubCutaneous every 8 hours      GI Prophylaxis:pantoprazole  Injectable 40 milliGRAM(s) IV Push daily      ***Tubes/Lines/Drains  ***  Peripheral IV  Arterial Line		                  Central Line                            Urinary Catheter		Indication: Strict I&O          [X] A ten-point review of systems was otherwise negative except as noted above.  [  ] Due to altered mental status/intubation, subjective information was not attained from the patient. History was obtained, to the extent possible, from review of the chart and collateral sources of information.    Daily     Daily     Diet, Clear Liquid:   Bariatric Clear Liquid (BARICLLIQ) (21 @ 11:05)      CURRENT MEDS:  Neurologic Medications  gabapentin   Solution 125 milliGRAM(s) Oral three times a day  HYDROmorphone  Injectable 0.25 milliGRAM(s) IV Push every 6 hours PRN Severe Pain (7 - 10)  ketorolac   Injectable 15 milliGRAM(s) IV Push every 6 hours PRN Moderate Pain (4 - 6)  prochlorperazine   Injectable 5 milliGRAM(s) IV Push every 6 hours PRN nausea/emesis    Respiratory Medications  ALBUTerol    90 MICROgram(s) HFA Inhaler 2 Puff(s) Inhalation every 6 hours PRN Wheezing    Cardiovascular Medications    Gastrointestinal Medications  lactated ringers. 1000 milliLiter(s) IV Continuous <Continuous>  pantoprazole  Injectable 40 milliGRAM(s) IV Push daily    Genitourinary Medications    Hematologic/Oncologic Medications  heparin   Injectable 5000 Unit(s) SubCutaneous every 8 hours    Antimicrobial/Immunologic Medications    Endocrine/Metabolic Medications    Topical/Other Medications      ICU Vital Signs Last 24 Hrs  T(C): 36.7 (2021 08:06), Max: 37.1 (15 Eric 2021 21:00)  T(F): 98 (2021 08:06), Max: 98.7 (15 Eric 2021 21:00)  HR: 68 (2021 08:06) (68 - 78)  BP: 114/53 (2021 08:06) (114/53 - 144/62)  BP(mean): 77 (2021 08:06) (77 - 77)  ABP: --  ABP(mean): --  RR: 17 (2021 08:06) (17 - 20)  SpO2: 98% (2021 04:20) (98% - 99%)        I&O's Summary    15 Eric 2021 07:01  -  2021 07:00  --------------------------------------------------------  IN: 2805 mL / OUT: 0 mL / NET: 2805 mL      I&O's Detail    15 Eric 2021 07:01  -  2021 07:00  --------------------------------------------------------  IN:    Lactated Ringers: 2400 mL    Oral Fluid: 405 mL  Total IN: 2805 mL    OUT:  Total OUT: 0 mL    Total NET: 2805 mL          PHYSICAL EXAM:    General/Neuro  awake alert following commands  Lungs:      clear to auscultation, Normal expansion/effort.     Cardiovascular : S1, S2.  Regular rate and rhythm.    Cardiac Rhythm: Normal Sinus Rhythm    GI: Abdomen soft, Non-tender, Non-distended.      Extremities: Extremities warm, pink, well-perfused    Derm: Good skin turgor, no skin breakdown.            CXR:     LABS:  CAPILLARY BLOOD GLUCOSE                              9.7    10.05 )-----------( 320      ( 2021 00:53 )             31.0       06-16    140  |  104  |  6<L>  ----------------------------<  98  3.7   |  26  |  0.6<L>    Ca    8.6      2021 00:53  Phos  2.1     06-16  Mg     2.0     06-16

## 2021-06-16 NOTE — DISCHARGE NOTE PROVIDER - HOSPITAL COURSE
This is a 39y/o presented to [facility] for elective surgery. pt underwent  s/p lap sleeve gastrectomy w/ b/l TAP block on 6/14/2021.  Post operatively pt upgraded to SICU for close monitoring.  pt treated medically and bariatric diet advanced as tolerated.  On 6/16/2021 pt without complaints tolerated bariatric diet. voided and ambulated. vital signs stable and afebrile. pt doing well and discharged home   in stable condition, pt advised to follow up with Dr Lopez as scheduled. prescription for zofran sent to her pharmacy. resume home medications  and precaution per bariatric surgery .

## 2021-06-16 NOTE — PROGRESS NOTE ADULT - SUBJECTIVE AND OBJECTIVE BOX
Procedure:  s/p lap sleeve gastrectomy w/ b/l TAP block  POD#2    PAST MEDICAL & SURGICAL HISTORY:  Asthma    Seizure  last seizure 1 1/2 yrs ago    Severe obesity (BMI &gt;= 40)    History of appendectomy            24H EVENTS    Vital Signs Last 24 Hrs  T(C): 36.7 (2021 08:06), Max: 37.1 (15 Eric 2021 21:00)  T(F): 98 (2021 08:06), Max: 98.7 (15 Eric 2021 21:00)  HR: 68 (2021 08:06) (68 - 78)  BP: 114/53 (2021 08:06) (114/53 - 162/72)  BP(mean): 77 (2021 08:06) (77 - 104)  RR: 17 (2021 08:06) (17 - 20)  SpO2: 98% (2021 04:20) (97% - 99%)        I&O's Detail    15 Eric 2021 07:01  -  2021 07:00  --------------------------------------------------------  IN:    Lactated Ringers: 2400 mL    Oral Fluid: 405 mL  Total IN: 2805 mL    OUT:  Total OUT: 0 mL    Total NET: 2805 mL                         9.7    10.05 )-----------( 320      (  @ 00:53 )             31.0                10.7   13.02 )-----------( 312      ( 06-15 @ 01:18 )             35.0                10.6   12.91 )-----------( 341      (  @ 21:09 )             34.7                    140   |  104   |  6                  Ca: 8.6    BMP:   ----------------------------< 98     M.0   (21 @ 00:53)             3.7    |  26    | 0.6                Ph: 2.1        MEDICATIONS  (STANDING):  gabapentin   Solution 125 milliGRAM(s) Oral three times a day  heparin   Injectable 5000 Unit(s) SubCutaneous every 8 hours  lactated ringers. 1000 milliLiter(s) (100 mL/Hr) IV Continuous <Continuous>  pantoprazole  Injectable 40 milliGRAM(s) IV Push daily    MEDICATIONS  (PRN):  ALBUTerol    90 MICROgram(s) HFA Inhaler 2 Puff(s) Inhalation every 6 hours PRN Wheezing  HYDROmorphone  Injectable 0.25 milliGRAM(s) IV Push every 6 hours PRN Severe Pain (7 - 10)  ketorolac   Injectable 15 milliGRAM(s) IV Push every 6 hours PRN Moderate Pain (4 - 6)  prochlorperazine   Injectable 5 milliGRAM(s) IV Push every 6 hours PRN nausea/emesis      Diet, Clear Liquid:   Bariatric Clear Liquid (BARICLLIQ) (21 @ 11:05)      PHYSICAL EXAM:  General Appearance: Appears well, NAD  Chest: Equal expansion bilaterally, equal breath sounds  CV: S1, S2, RRR  Abdomen: Soft, incisions c/d/i   Extremities: no calf tenderness  Neuro: A&Ox3

## 2021-06-16 NOTE — CHART NOTE - NSCHARTNOTEFT_GEN_A_CORE
pt seen without complaints tolerated bariatric diet, voided and ambulated. vital signs stable and afebrile. pt doing well . Spoke with Dr Lopez pt can be discharged home today with prescription for zofran PRN .Pt advised to follow up with Dr Lopez as scheduled. resume home medication and precaution per bariatric surgery.  All questions answered @ this time.

## 2021-06-16 NOTE — DISCHARGE NOTE PROVIDER - NSDCMRMEDTOKEN_GEN_ALL_CORE_FT
albuterol 2.5 mg/3 mL (0.083%) inhalation solution: 3 milliliter(s) by nebulizer every 4 hours, As Needed -for shortness of breath and/or wheezing   ondansetron 4 mg oral tablet, disintegratin tab(s) orally every 8 hours, As Needed

## 2021-06-16 NOTE — DISCHARGE NOTE PROVIDER - CARE PROVIDER_API CALL
Ely Lopez)  [facility]  [address] [place] [address], [address] [place] [address]  Phone: [phone]  Fax: [phone]  Follow Up Time:

## 2021-06-16 NOTE — PROGRESS NOTE ADULT - ATTENDING COMMENTS
41yo female with class III obesity s/p robotic sleeve gastrectomy 6/14 POD#2. Doing well. Reported occasional abdominal discomfort but pain is minimal. Occasional nausea, vomited a small amount over night. Tolerating bariatric clear liquid diet 3-4 oz. Ambulating well. Using incentive spirometer. Continue DVT ppx with heparin, incentive spirometry, and GI ppx. Discharge home with Zofran.

## 2021-06-16 NOTE — PROGRESS NOTE ADULT - ASSESSMENT
ASSESSMENT & PLAN  40y Female  s/p lap sleeve gastrectomy w/ b/l TAP block    NEURO:    Acute pain-controlled with Tylenol, Toradol, gabapentin, dilaudid PRN    RESP:     Saturating well on RA    hx of asthma-no recent exacerbation as per patient    Activity-increase as tolerated       CARDS:     no PMH    post op EKG NSR,     GI/NUTR:     Diet-bariatric protocol will d/c home when on 4oz   -vomited after 2oz, so could not be discharged home  -however later in a night drank 8oz apple juice, and did fine    GI Prophylaxis-PPI    /RENAL:   - Monitor UO-voiding  - LR @ 100  - Monitor and replete electrolyes as needed  - Labs:          BUN/Cr- 7/0.6  -->,  6/0.5  --> 6/0.6          Electrolytes-Na 140 // K 3.7 // Mg 2.0 //  Phos 2.1     HEME/ONC:   - DVT prophylaxis- heparin   Injectable  - Labs:  Hb/Hct:  10.6/34.7  -->,  10.7/35.0  -->9.7/31  Plts:  341  -->,  312  -->  320  PTT/INR:   T&S:      ID:  -WBC: 12.4 --> 13.02->10  -Afebrile  - completed periop abx     ENDO:    FSG q6    MSK:  -OOB, Ambulating    LINES/DRAINS:  PIV    DISPO:    SDU

## 2021-06-16 NOTE — DISCHARGE NOTE NURSING/CASE MANAGEMENT/SOCIAL WORK - PATIENT PORTAL LINK FT
You can access the FollowMyHealth Patient Portal offered by [facility] by registering at the following website: [email or web address]. By joining [employer]’s FollowMyHealth portal, you will also be able to view your health information using other applications (apps) compatible with our system.

## 2021-06-16 NOTE — DISCHARGE NOTE PROVIDER - NSDCFUADDINST_GEN_ALL_CORE_FT
follow up with Dr Lopez as scheduled. call office for information  follow up with your PMD    no strenuous activity  keep wound clean and dry  no tub bath  resume home medication    if experience fever, shortness of breath, chest pain, dizziness, vomiting , bleeding or drainage from wound call MD or return to ED

## 2021-06-16 NOTE — PROGRESS NOTE ADULT - ASSESSMENT
A/P 40y Female  s/p lap sleeve gastrectomy w/ b/l TAP block POD#2  pt without complaints  continue current management and close observation

## 2021-06-23 NOTE — HISTORY OF PRESENT ILLNESS
[Procedure: ___] : Procedure performed: [unfilled]  [Date of Surgery: ___] : Date of Surgery:   [unfilled] [Surgeon Name:   ___] : Surgeon Name: Dr. SHRESTHA [___ Days Post Op] : [unfilled] days  [de-identified] : 41yo female with PMHx with KEZIA, class III obesity s/p robotic sleeve gastrectomy 6/14/2021 presenting for post-operative visit. At this time, patient denies fever/chills, nausea/vomiting, chest pain or shortness of breath. Was unable to tolerate eggs or tuna salad. But drinking plenty of water, going to start protein shakes. [Phase 2] : Phase 2

## 2021-06-23 NOTE — PHYSICAL EXAM
[de-identified] : soft, non-tender, no rebound/guarding, no masses/hernias, wounds without erythema, drainage, or induration

## 2021-06-23 NOTE — ASSESSMENT
[___ Days Post Op] : [unfilled] days [de-identified] : 39yo female with PMHx of KEZIA and class III obesity s/p robotic sleeve gastrectomy 6/14/2021. Doing well.\par -continue adequate protein intake - goal of 70g per day\par -continue adequate hydration - goal of 60oz water or equivalent per day\par -continue PPI, MV, calcium, and B12\par -continue light activity \par -return to office for 1 month follow up\par -call with concerns

## 2021-06-23 NOTE — CONSULT LETTER
[Dear  ___] : Dear  [unfilled], [Courtesy Letter:] : I had the pleasure of seeing your patient, [unfilled], in my office today. [Please see my note below.] : Please see my note below. [Sincerely,] : Sincerely, [FreeTextEntry3] : Ely Lopez MD FACS\par Bariatric & Minimally Invasive Surgery\par [facility]\par [phone]

## 2021-07-16 NOTE — PHYSICAL EXAM
[de-identified] : soft, non-tender, no rebound/guarding, no masses/hernias, wounds without erythema, drainage, or induration

## 2021-07-16 NOTE — HISTORY OF PRESENT ILLNESS
[Procedure: ___] : Procedure performed: [unfilled]  [Date of Surgery: ___] : Date of Surgery:   [unfilled] [Surgeon Name:   ___] : Surgeon Name: Dr. SHRESTHA [___ Months Post Op] : [unfilled] months [Phase 4] : Phase 4 [de-identified] : 39yo female with PMHx with KEZIA, class III obesity s/p robotic sleeve gastrectomy 6/14/2021 presenting for post-operative visit. Reports vomiting and 2 seizures since surgery. At this time, patient states that she feels weak. Sleeping poorly, cannot fall asleep until 4-5am. Protein shakes - none. Water - 2-16oz of water per day, but mostly drinking coffee all day long. Tolerating tuna, chicken. Cannot tolerate tuna salad or chicken salad. Exercise - walking a lot. Denies reflux symptoms. Taking PPI daily. Taking MV, calcium BID, B12 daily.

## 2021-07-16 NOTE — ASSESSMENT
[___ Months Post Op] : [unfilled] months [de-identified] : 39yo female with PMHx of KEZIA and class III obesity s/p robotic sleeve gastrectomy 6/14/2021. Doing well.\par -continue adequate protein intake - goal of 70g per day\par -continue adequate hydration - goal of 60oz water or equivalent per day\par -continue PPI, MV, calcium, and B12\par -cleared for full activity\par -return to office for 3 month follow up\par -call with concerns

## 2021-08-17 PROBLEM — D50.9 IRON DEFICIENCY ANEMIA, UNSPECIFIED IRON DEFICIENCY ANEMIA TYPE: Status: ACTIVE | Noted: 2020-12-01

## 2021-08-17 PROBLEM — E55.9 VITAMIN D DEFICIENCY: Status: ACTIVE | Noted: 2021-01-06

## 2021-08-17 NOTE — HISTORY OF PRESENT ILLNESS
[Procedure: ___] : Procedure performed: [unfilled]  [Date of Surgery: ___] : Date of Surgery:   [unfilled] [Pre-Op Weight ___] : Pre-op weight was [unfilled] lbs [___ Months Post Op] : [unfilled] months [de-identified] : Patient had surgery approximately 2 months ago. She feels that she is more in control of the changes that she has to make based on the dietary requirements.\par She denies heartburn/reflux. She vomited a few times after eating raw octopus. I made patient aware that she should follow the dietary guidelines and add foods as recommended. Her bowel movements are twice daily.\par She is compliant with CPAP use and seizures are controlled on Topamax.

## 2021-08-17 NOTE — PLAN
[FreeTextEntry1] : Plan: Continue to focus on dietary guidelines.\par          RTO in 1 month with blood work.\par          Call with concerns.

## 2021-08-17 NOTE — DATA REVIEWED
[FreeTextEntry1] : Wt. change since last visit: - 7 lbs\par %EWL: 19\par TWL: 28.2 lbs\par \par \par

## 2021-08-17 NOTE — ASSESSMENT
[FreeTextEntry1] : EDITH PURDY is a 40 year female seen today for postoperative bariatric follow up visit. \par Breakfast - Ensure Max or eggs.Lunch - baked/broiled chicken. Dinner softly cooked string beans with chicken or octopus.\par Fluid intake is adequate with mostly water, Crystal Light and Aloe drink.\par Walking daily for 30-45 minutes and plans to go to the gym in the next few weeks to add weight bearing exercises.\par \par \par \par \par \par

## 2021-08-17 NOTE — REASON FOR VISIT
[Post Operative Visit] : a post operative visit for [Morbid Obesity (BMI>40)] : morbid obesity (bmi>40) [FreeTextEntry2] : Sleeve Gastrectomy on 6/14/2021

## 2021-09-21 PROBLEM — E66.01 MORBID (SEVERE) OBESITY DUE TO EXCESS CALORIES: Chronic | Status: ACTIVE | Noted: 2021-01-01

## 2021-09-21 PROBLEM — R56.9 UNSPECIFIED CONVULSIONS: Chronic | Status: ACTIVE | Noted: 2020-10-22

## 2021-09-21 PROBLEM — R56.9 SEIZURES: Status: ACTIVE | Noted: 2021-01-01

## 2021-10-04 NOTE — ASSESSMENT
[FreeTextEntry1] : EDITH PURDY is a 40 year female seen today for follow up visit. Patient is not compliant with dietary guidelines. She states that she is not hungry and will only eat 1 meal sometimes. She sets alarms on her phone as reminders then ignores them. It has been rather challenging to get patient to focus on the importance of having 3 meals day with protein at each meal. I reinforced that skipping meals do not means more weight loss but could cause nutritional deficeinces which could be difficult to manage.\par Breakfast - Ensure Max or eggs. Lunch she mostly skips. Dinner softly cooked string beans with chicken. I reviewed with patient the foods that she likes and recommend that she plans her meals to include proteins at each meal. She likes chicken salad, chili, lentil soup, meatballs and meatloaf and I reassured her that those are all acceptable dietary choices.\par Fluid intake is mostly coffee which by her statement decreases her appetite as well. I encouraged patient to decrease coffee intake and focus more in hydrating fluids such as Crystal Light, Gatorade Zero and diet Snapple.\par Walking daily for 30-45 minutes and she added weight bearing exercises.ring exercises.

## 2021-10-04 NOTE — PLAN
[FreeTextEntry1] : Plan: Follow dietary guidelines as discussed.\par          Increase fluid intake.\par          Follow up with neurology.\par          RTO in 2 weeks.\par          Call with concerns.

## 2021-10-04 NOTE — DATA REVIEWED
[FreeTextEntry1] : Wt. change since last visit: -5.8 lbs\par %EWL: 24\par TWL: 34 lbs\par \par \par

## 2021-10-04 NOTE — HISTORY OF PRESENT ILLNESS
[de-identified] : EDITH PURDY is being seen for a follow up visit. Sleeve Gastrectomy on 6/14/2021. \par  \par  [de-identified] : Patient had surgery approximately 3 months ago. Patient states that passed out a few weeks ago which was most likely a seizure as she was noncompliant with her Topamax. I had a lengthy discussion with patient about management of comorbid conditions and optimizing treatment as prescribed. Strongly recommend that she follow up with neuro.\par She denies heartburn/reflux. She is c/o constipation but did not want to try Colace or MOM as she gets better relief with Lactulose which was prescribed by her PCP. \par I tried to get patient to understand that lack of food intake and fluid is also contributing to her constipation.\par She is compliant with CPAP use.

## 2021-10-19 PROBLEM — K59.00 CONSTIPATION: Status: ACTIVE | Noted: 2021-01-01

## 2021-10-19 PROBLEM — E66.9 OBESITY (BMI 30-39.9): Status: ACTIVE | Noted: 2021-01-01

## 2021-10-19 PROBLEM — G47.33 OBSTRUCTIVE SLEEP APNEA: Status: ACTIVE | Noted: 2021-01-01

## 2021-10-19 PROBLEM — Z87.898 HISTORY OF MORBID OBESITY: Status: RESOLVED | Noted: 2017-02-27 | Resolved: 2021-01-01

## 2021-10-19 NOTE — ASSESSMENT
[FreeTextEntry1] : EDITH PURDY is a 40 year female seen today for bariatric follow up visit. Patient states that she feels well and she is focusing on self care.\par Patient is compliant with dietary guidelines.\par Breakfast - Ensure Max or a boiled egg. Lunch - chopped cauliflower with fish. Dinner is  shredded/baked chicken with green leafy vegetables. A few days/week she will have either a small baked/sweet potato. If she gets busy and cannot have a meal she will eat a healthful snack such as apiece of fruit or nuts with a few cranberries or raisins. \par Fluid intake is adequate with mostly flavored water and warm herbal teas.\par She is exercising a minimum of 5 days either running on the treadmill, [place] and most recently she added 2 days of boxing. She has been using a .

## 2021-10-19 NOTE — REASON FOR VISIT
[Follow-Up Visit] : a follow-up visit for [Other___] : [unfilled] [FreeTextEntry2] : Sleeve Gastrectomy on 6/14/2021

## 2021-10-19 NOTE — HISTORY OF PRESENT ILLNESS
[Procedure: ___] : Procedure performed: [unfilled]  [Date of Surgery: ___] : Date of Surgery:   [unfilled] [Pre-Op Weight ___] : Pre-op weight was [unfilled] lbs [de-identified] : Patient had surgery approximately 4 months ago. \par She denies heartburn/reflux/vomiting. Constipation improved with Lactulose.\par No recent seizures. She is using her CPAP.

## 2021-10-19 NOTE — PLAN
[FreeTextEntry1] : Plan: Continue with meal planning and exercise.\par          RTO in 2 months with blood work.\par          Call with concerns.

## 2021-10-19 NOTE — PHYSICAL EXAM
[Normal] : normoactive bowel sounds, soft and nontender, no hepatosplenomegaly or masses appreciated. Incisions healing appropriately without erythema or drainage [de-identified] : soft, nondistended. Well healed incisions

## 2021-10-19 NOTE — DATA REVIEWED
[FreeTextEntry1] : Blood work reviewed. All essentially within normal limits. Patient will continue all vitamins and minerals as recommended.

## 2021-11-29 NOTE — ED PROVIDER NOTE - OBTAINED AND REVIEWED OLD RECORDS MULTI-SELECT OPTIONS
Prior Hospital/ED Visits Hemigard Postcare Instructions: The HEMIGARD strips are to remain completely dry for at least 5-7 days.

## 2021-11-29 NOTE — ED BEHAVIORAL HEALTH ASSESSMENT NOTE - NS ED BHA PLAN
Treat and Release Metronidazole Counseling:  I discussed with the patient the risks of metronidazole including but not limited to seizures, nausea/vomiting, a metallic taste in the mouth, nausea/vomiting and severe allergy.

## 2022-01-01 ENCOUNTER — INPATIENT (INPATIENT)
Facility: HOSPITAL | Age: 42
LOS: 2 days | End: 2022-01-17
Attending: STUDENT IN AN ORGANIZED HEALTH CARE EDUCATION/TRAINING PROGRAM | Admitting: STUDENT IN AN ORGANIZED HEALTH CARE EDUCATION/TRAINING PROGRAM
Payer: MEDICAID

## 2022-01-01 VITALS — DIASTOLIC BLOOD PRESSURE: 99 MMHG | HEART RATE: 29 BPM | OXYGEN SATURATION: 81 % | SYSTOLIC BLOOD PRESSURE: 132 MMHG

## 2022-01-01 VITALS
DIASTOLIC BLOOD PRESSURE: 72 MMHG | RESPIRATION RATE: 16 BRPM | TEMPERATURE: 98 F | SYSTOLIC BLOOD PRESSURE: 124 MMHG | HEIGHT: 65 IN | HEART RATE: 158 BPM | OXYGEN SATURATION: 98 % | WEIGHT: 250 LBS

## 2022-01-01 DIAGNOSIS — R45.89 OTHER SYMPTOMS AND SIGNS INVOLVING EMOTIONAL STATE: ICD-10-CM

## 2022-01-01 DIAGNOSIS — O03.9 COMPLETE OR UNSPECIFIED SPONTANEOUS ABORTION WITHOUT COMPLICATION: Chronic | ICD-10-CM

## 2022-01-01 DIAGNOSIS — Z90.49 ACQUIRED ABSENCE OF OTHER SPECIFIED PARTS OF DIGESTIVE TRACT: Chronic | ICD-10-CM

## 2022-01-01 LAB
ALBUMIN SERPL ELPH-MCNC: 2.5 G/DL — LOW (ref 3.5–5.2)
ALBUMIN SERPL ELPH-MCNC: 4 G/DL — SIGNIFICANT CHANGE UP (ref 3.5–5.2)
ALBUMIN SERPL ELPH-MCNC: 4.6 G/DL — SIGNIFICANT CHANGE UP (ref 3.5–5.2)
ALP SERPL-CCNC: 64 U/L — SIGNIFICANT CHANGE UP (ref 30–115)
ALP SERPL-CCNC: 72 U/L — SIGNIFICANT CHANGE UP (ref 30–115)
ALP SERPL-CCNC: 74 U/L — SIGNIFICANT CHANGE UP (ref 30–115)
ALT FLD-CCNC: 25 U/L — SIGNIFICANT CHANGE UP (ref 0–41)
ALT FLD-CCNC: 6 U/L — SIGNIFICANT CHANGE UP (ref 0–41)
ALT FLD-CCNC: 8 U/L — SIGNIFICANT CHANGE UP (ref 0–41)
ANION GAP SERPL CALC-SCNC: 12 MMOL/L — SIGNIFICANT CHANGE UP (ref 7–14)
ANION GAP SERPL CALC-SCNC: 17 MMOL/L — HIGH (ref 7–14)
ANION GAP SERPL CALC-SCNC: 27 MMOL/L — HIGH (ref 7–14)
APAP SERPL-MCNC: <5 UG/ML — LOW (ref 10–30)
APPEARANCE UR: CLEAR — SIGNIFICANT CHANGE UP
APTT BLD: 71.5 SEC — CRITICAL HIGH (ref 27–39.2)
AST SERPL-CCNC: 17 U/L — SIGNIFICANT CHANGE UP (ref 0–41)
AST SERPL-CCNC: 33 U/L — SIGNIFICANT CHANGE UP (ref 0–41)
AST SERPL-CCNC: 73 U/L — HIGH (ref 0–41)
BASOPHILS # BLD AUTO: 0.03 K/UL — SIGNIFICANT CHANGE UP (ref 0–0.2)
BASOPHILS # BLD AUTO: 0.05 K/UL — SIGNIFICANT CHANGE UP (ref 0–0.2)
BASOPHILS # BLD AUTO: 0.11 K/UL — SIGNIFICANT CHANGE UP (ref 0–0.2)
BASOPHILS NFR BLD AUTO: 0.5 % — SIGNIFICANT CHANGE UP (ref 0–1)
BASOPHILS NFR BLD AUTO: 0.6 % — SIGNIFICANT CHANGE UP (ref 0–1)
BASOPHILS NFR BLD AUTO: 1.1 % — HIGH (ref 0–1)
BILIRUB DIRECT SERPL-MCNC: <0.2 MG/DL — SIGNIFICANT CHANGE UP (ref 0–0.3)
BILIRUB INDIRECT FLD-MCNC: SIGNIFICANT CHANGE UP MG/DL (ref 0.2–1.2)
BILIRUB SERPL-MCNC: 0.3 MG/DL — SIGNIFICANT CHANGE UP (ref 0.2–1.2)
BILIRUB SERPL-MCNC: 0.4 MG/DL — SIGNIFICANT CHANGE UP (ref 0.2–1.2)
BILIRUB SERPL-MCNC: <0.2 MG/DL — SIGNIFICANT CHANGE UP (ref 0.2–1.2)
BILIRUB UR-MCNC: NEGATIVE — SIGNIFICANT CHANGE UP
BLD GP AB SCN SERPL QL: SIGNIFICANT CHANGE UP
BUN SERPL-MCNC: 10 MG/DL — SIGNIFICANT CHANGE UP (ref 10–20)
BUN SERPL-MCNC: 12 MG/DL — SIGNIFICANT CHANGE UP (ref 10–20)
BUN SERPL-MCNC: 7 MG/DL — LOW (ref 10–20)
CALCIUM SERPL-MCNC: 8.9 MG/DL — SIGNIFICANT CHANGE UP (ref 8.5–10.1)
CALCIUM SERPL-MCNC: 9.4 MG/DL — SIGNIFICANT CHANGE UP (ref 8.5–10.1)
CALCIUM SERPL-MCNC: 9.7 MG/DL — SIGNIFICANT CHANGE UP (ref 8.5–10.1)
CHLORIDE SERPL-SCNC: 107 MMOL/L — SIGNIFICANT CHANGE UP (ref 98–110)
CHLORIDE SERPL-SCNC: 107 MMOL/L — SIGNIFICANT CHANGE UP (ref 98–110)
CHLORIDE SERPL-SCNC: 111 MMOL/L — HIGH (ref 98–110)
CHOLEST SERPL-MCNC: 160 MG/DL — SIGNIFICANT CHANGE UP
CK SERPL-CCNC: 114 U/L — SIGNIFICANT CHANGE UP (ref 0–225)
CO2 SERPL-SCNC: 12 MMOL/L — LOW (ref 17–32)
CO2 SERPL-SCNC: 16 MMOL/L — LOW (ref 17–32)
CO2 SERPL-SCNC: 18 MMOL/L — SIGNIFICANT CHANGE UP (ref 17–32)
COLOR SPEC: COLORLESS — SIGNIFICANT CHANGE UP
CREAT SERPL-MCNC: 0.5 MG/DL — LOW (ref 0.7–1.5)
CREAT SERPL-MCNC: 0.8 MG/DL — SIGNIFICANT CHANGE UP (ref 0.7–1.5)
CREAT SERPL-MCNC: 1.1 MG/DL — SIGNIFICANT CHANGE UP (ref 0.7–1.5)
DIFF PNL FLD: NEGATIVE — SIGNIFICANT CHANGE UP
EOSINOPHIL # BLD AUTO: 0.03 K/UL — SIGNIFICANT CHANGE UP (ref 0–0.7)
EOSINOPHIL # BLD AUTO: 0.17 K/UL — SIGNIFICANT CHANGE UP (ref 0–0.7)
EOSINOPHIL # BLD AUTO: 0.41 K/UL — SIGNIFICANT CHANGE UP (ref 0–0.7)
EOSINOPHIL NFR BLD AUTO: 0.5 % — SIGNIFICANT CHANGE UP (ref 0–8)
EOSINOPHIL NFR BLD AUTO: 2 % — SIGNIFICANT CHANGE UP (ref 0–8)
EOSINOPHIL NFR BLD AUTO: 4.1 % — SIGNIFICANT CHANGE UP (ref 0–8)
GAS PNL BLDA: SIGNIFICANT CHANGE UP
GLUCOSE BLDC GLUCOMTR-MCNC: 200 MG/DL — HIGH (ref 70–99)
GLUCOSE BLDC GLUCOMTR-MCNC: 85 MG/DL — SIGNIFICANT CHANGE UP (ref 70–99)
GLUCOSE SERPL-MCNC: 110 MG/DL — HIGH (ref 70–99)
GLUCOSE SERPL-MCNC: 121 MG/DL — HIGH (ref 70–99)
GLUCOSE SERPL-MCNC: 400 MG/DL — HIGH (ref 70–99)
GLUCOSE UR QL: NEGATIVE — SIGNIFICANT CHANGE UP
HCG SERPL QL: NEGATIVE — SIGNIFICANT CHANGE UP
HCT VFR BLD CALC: 30.1 % — LOW (ref 37–47)
HCT VFR BLD CALC: 33.4 % — LOW (ref 37–47)
HCT VFR BLD CALC: 37 % — SIGNIFICANT CHANGE UP (ref 37–47)
HDLC SERPL-MCNC: 58 MG/DL — SIGNIFICANT CHANGE UP
HGB BLD-MCNC: 10.7 G/DL — LOW (ref 12–16)
HGB BLD-MCNC: 11.4 G/DL — LOW (ref 12–16)
HGB BLD-MCNC: 8.3 G/DL — LOW (ref 12–16)
IMM GRANULOCYTES NFR BLD AUTO: 0.1 % — SIGNIFICANT CHANGE UP (ref 0.1–0.3)
IMM GRANULOCYTES NFR BLD AUTO: 0.4 % — HIGH (ref 0.1–0.3)
IMM GRANULOCYTES NFR BLD AUTO: 4.4 % — HIGH (ref 0.1–0.3)
INR BLD: 1.55 RATIO — HIGH (ref 0.65–1.3)
KETONES UR-MCNC: NEGATIVE — SIGNIFICANT CHANGE UP
LEUKOCYTE ESTERASE UR-ACNC: NEGATIVE — SIGNIFICANT CHANGE UP
LIPID PNL WITH DIRECT LDL SERPL: 79 MG/DL — SIGNIFICANT CHANGE UP
LYMPHOCYTES # BLD AUTO: 1.7 K/UL — SIGNIFICANT CHANGE UP (ref 1.2–3.4)
LYMPHOCYTES # BLD AUTO: 2.39 K/UL — SIGNIFICANT CHANGE UP (ref 1.2–3.4)
LYMPHOCYTES # BLD AUTO: 20 % — LOW (ref 20.5–51.1)
LYMPHOCYTES # BLD AUTO: 36.2 % — SIGNIFICANT CHANGE UP (ref 20.5–51.1)
LYMPHOCYTES # BLD AUTO: 4.27 K/UL — HIGH (ref 1.2–3.4)
LYMPHOCYTES # BLD AUTO: 43.1 % — SIGNIFICANT CHANGE UP (ref 20.5–51.1)
MAGNESIUM SERPL-MCNC: 2 MG/DL — SIGNIFICANT CHANGE UP (ref 1.8–2.4)
MAGNESIUM SERPL-MCNC: 2.2 MG/DL — SIGNIFICANT CHANGE UP (ref 1.8–2.4)
MCHC RBC-ENTMCNC: 25.2 PG — LOW (ref 27–31)
MCHC RBC-ENTMCNC: 25.6 PG — LOW (ref 27–31)
MCHC RBC-ENTMCNC: 26 PG — LOW (ref 27–31)
MCHC RBC-ENTMCNC: 27.6 G/DL — LOW (ref 32–37)
MCHC RBC-ENTMCNC: 30.8 G/DL — LOW (ref 32–37)
MCHC RBC-ENTMCNC: 32 G/DL — SIGNIFICANT CHANGE UP (ref 32–37)
MCV RBC AUTO: 81.3 FL — SIGNIFICANT CHANGE UP (ref 81–99)
MCV RBC AUTO: 83 FL — SIGNIFICANT CHANGE UP (ref 81–99)
MCV RBC AUTO: 91.2 FL — SIGNIFICANT CHANGE UP (ref 81–99)
MONOCYTES # BLD AUTO: 0.18 K/UL — SIGNIFICANT CHANGE UP (ref 0.1–0.6)
MONOCYTES # BLD AUTO: 0.57 K/UL — SIGNIFICANT CHANGE UP (ref 0.1–0.6)
MONOCYTES # BLD AUTO: 0.69 K/UL — HIGH (ref 0.1–0.6)
MONOCYTES NFR BLD AUTO: 2.7 % — SIGNIFICANT CHANGE UP (ref 1.7–9.3)
MONOCYTES NFR BLD AUTO: 6.7 % — SIGNIFICANT CHANGE UP (ref 1.7–9.3)
MONOCYTES NFR BLD AUTO: 7 % — SIGNIFICANT CHANGE UP (ref 1.7–9.3)
NEUTROPHILS # BLD AUTO: 3.69 K/UL — SIGNIFICANT CHANGE UP (ref 1.4–6.5)
NEUTROPHILS # BLD AUTO: 4.41 K/UL — SIGNIFICANT CHANGE UP (ref 1.4–6.5)
NEUTROPHILS # BLD AUTO: 5.96 K/UL — SIGNIFICANT CHANGE UP (ref 1.4–6.5)
NEUTROPHILS NFR BLD AUTO: 44.6 % — SIGNIFICANT CHANGE UP (ref 42.2–75.2)
NEUTROPHILS NFR BLD AUTO: 55.7 % — SIGNIFICANT CHANGE UP (ref 42.2–75.2)
NEUTROPHILS NFR BLD AUTO: 70.3 % — SIGNIFICANT CHANGE UP (ref 42.2–75.2)
NITRITE UR-MCNC: NEGATIVE — SIGNIFICANT CHANGE UP
NON HDL CHOLESTEROL: 102 MG/DL — SIGNIFICANT CHANGE UP
NRBC # BLD: 0 /100 WBCS — SIGNIFICANT CHANGE UP (ref 0–0)
PH UR: 8 — SIGNIFICANT CHANGE UP (ref 5–8)
PLATELET # BLD AUTO: 305 K/UL — SIGNIFICANT CHANGE UP (ref 130–400)
PLATELET # BLD AUTO: 338 K/UL — SIGNIFICANT CHANGE UP (ref 130–400)
PLATELET # BLD AUTO: 86 K/UL — LOW (ref 130–400)
POTASSIUM SERPL-MCNC: 4.8 MMOL/L — SIGNIFICANT CHANGE UP (ref 3.5–5)
POTASSIUM SERPL-MCNC: 5.5 MMOL/L — HIGH (ref 3.5–5)
POTASSIUM SERPL-MCNC: 5.9 MMOL/L — HIGH (ref 3.5–5)
POTASSIUM SERPL-SCNC: 4.8 MMOL/L — SIGNIFICANT CHANGE UP (ref 3.5–5)
POTASSIUM SERPL-SCNC: 5.5 MMOL/L — HIGH (ref 3.5–5)
POTASSIUM SERPL-SCNC: 5.9 MMOL/L — HIGH (ref 3.5–5)
PROT SERPL-MCNC: 4 G/DL — LOW (ref 6–8)
PROT SERPL-MCNC: 6.2 G/DL — SIGNIFICANT CHANGE UP (ref 6–8)
PROT SERPL-MCNC: 7.6 G/DL — SIGNIFICANT CHANGE UP (ref 6–8)
PROT UR-MCNC: NEGATIVE — SIGNIFICANT CHANGE UP
PROTHROM AB SERPL-ACNC: 17.7 SEC — HIGH (ref 9.95–12.87)
RBC # BLD: 3.3 M/UL — LOW (ref 4.2–5.4)
RBC # BLD: 4.11 M/UL — LOW (ref 4.2–5.4)
RBC # BLD: 4.46 M/UL — SIGNIFICANT CHANGE UP (ref 4.2–5.4)
RBC # FLD: 14.5 % — SIGNIFICANT CHANGE UP (ref 11.5–14.5)
RBC # FLD: 14.6 % — HIGH (ref 11.5–14.5)
RBC # FLD: 14.7 % — HIGH (ref 11.5–14.5)
SALICYLATES SERPL-MCNC: <0.3 MG/DL — LOW (ref 4–30)
SARS-COV-2 RNA SPEC QL NAA+PROBE: SIGNIFICANT CHANGE UP
SODIUM SERPL-SCNC: 140 MMOL/L — SIGNIFICANT CHANGE UP (ref 135–146)
SODIUM SERPL-SCNC: 141 MMOL/L — SIGNIFICANT CHANGE UP (ref 135–146)
SODIUM SERPL-SCNC: 146 MMOL/L — SIGNIFICANT CHANGE UP (ref 135–146)
SP GR SPEC: 1.01 — LOW (ref 1.01–1.03)
TRIGL SERPL-MCNC: 77 MG/DL — SIGNIFICANT CHANGE UP
TROPONIN T SERPL-MCNC: 0.13 NG/ML — CRITICAL HIGH
TROPONIN T SERPL-MCNC: <0.01 NG/ML — SIGNIFICANT CHANGE UP
UROBILINOGEN FLD QL: SIGNIFICANT CHANGE UP
WBC # BLD: 6.61 K/UL — SIGNIFICANT CHANGE UP (ref 4.8–10.8)
WBC # BLD: 8.48 K/UL — SIGNIFICANT CHANGE UP (ref 4.8–10.8)
WBC # BLD: 9.9 K/UL — SIGNIFICANT CHANGE UP (ref 4.8–10.8)
WBC # FLD AUTO: 6.61 K/UL — SIGNIFICANT CHANGE UP (ref 4.8–10.8)
WBC # FLD AUTO: 8.48 K/UL — SIGNIFICANT CHANGE UP (ref 4.8–10.8)
WBC # FLD AUTO: 9.9 K/UL — SIGNIFICANT CHANGE UP (ref 4.8–10.8)

## 2022-01-01 PROCEDURE — 93010 ELECTROCARDIOGRAM REPORT: CPT | Mod: 76

## 2022-01-01 PROCEDURE — 99253 IP/OBS CNSLTJ NEW/EST LOW 45: CPT

## 2022-01-01 PROCEDURE — 95816 EEG AWAKE AND DROWSY: CPT | Mod: 26

## 2022-01-01 PROCEDURE — 99233 SBSQ HOSP IP/OBS HIGH 50: CPT

## 2022-01-01 PROCEDURE — G0427: CPT | Mod: 95

## 2022-01-01 PROCEDURE — 93010 ELECTROCARDIOGRAM REPORT: CPT

## 2022-01-01 PROCEDURE — 99285 EMERGENCY DEPT VISIT HI MDM: CPT

## 2022-01-01 PROCEDURE — 99223 1ST HOSP IP/OBS HIGH 75: CPT

## 2022-01-01 PROCEDURE — 70450 CT HEAD/BRAIN W/O DYE: CPT | Mod: 26,MA

## 2022-01-01 PROCEDURE — 71045 X-RAY EXAM CHEST 1 VIEW: CPT | Mod: 26

## 2022-01-01 PROCEDURE — 99221 1ST HOSP IP/OBS SF/LOW 40: CPT

## 2022-01-01 PROCEDURE — 99232 SBSQ HOSP IP/OBS MODERATE 35: CPT

## 2022-01-01 RX ORDER — ATROPINE SULFATE 0.1 MG/ML
1 SYRINGE (ML) INJECTION ONCE
Refills: 0 | Status: DISCONTINUED | OUTPATIENT
Start: 2022-01-01 | End: 2022-01-01

## 2022-01-01 RX ORDER — TOPIRAMATE 25 MG
1 TABLET ORAL
Qty: 0 | Refills: 0 | DISCHARGE

## 2022-01-01 RX ORDER — ALTEPLASE 100 MG
9 KIT INTRAVENOUS ONCE
Refills: 0 | Status: DISCONTINUED | OUTPATIENT
Start: 2022-01-01 | End: 2022-01-01

## 2022-01-01 RX ORDER — PREGABALIN 225 MG/1
1000 CAPSULE ORAL DAILY
Refills: 0 | Status: DISCONTINUED | OUTPATIENT
Start: 2022-01-01 | End: 2022-01-01

## 2022-01-01 RX ORDER — URSODIOL 250 MG/1
300 TABLET, FILM COATED ORAL
Refills: 0 | Status: DISCONTINUED | OUTPATIENT
Start: 2022-01-01 | End: 2022-01-01

## 2022-01-01 RX ORDER — ENOXAPARIN SODIUM 100 MG/ML
40 INJECTION SUBCUTANEOUS DAILY
Refills: 0 | Status: DISCONTINUED | OUTPATIENT
Start: 2022-01-01 | End: 2022-01-01

## 2022-01-01 RX ORDER — ALTEPLASE 100 MG
20 KIT INTRAVENOUS ONCE
Refills: 0 | Status: DISCONTINUED | OUTPATIENT
Start: 2022-01-01 | End: 2022-01-01

## 2022-01-01 RX ORDER — LEVETIRACETAM 250 MG/1
500 TABLET, FILM COATED ORAL EVERY 12 HOURS
Refills: 0 | Status: DISCONTINUED | OUTPATIENT
Start: 2022-01-01 | End: 2022-01-01

## 2022-01-01 RX ORDER — DIPHENHYDRAMINE HCL 50 MG
50 CAPSULE ORAL ONCE
Refills: 0 | Status: COMPLETED | OUTPATIENT
Start: 2022-01-01 | End: 2022-01-01

## 2022-01-01 RX ORDER — HYDROXYZINE HCL 10 MG
1 TABLET ORAL
Qty: 0 | Refills: 0 | DISCHARGE

## 2022-01-01 RX ORDER — OLANZAPINE 15 MG/1
1 TABLET, FILM COATED ORAL
Qty: 0 | Refills: 0 | DISCHARGE

## 2022-01-01 RX ORDER — MAGNESIUM SULFATE 500 MG/ML
2 VIAL (ML) INJECTION ONCE
Refills: 0 | Status: COMPLETED | OUTPATIENT
Start: 2022-01-01 | End: 2022-01-01

## 2022-01-01 RX ORDER — SODIUM CHLORIDE 9 MG/ML
1000 INJECTION INTRAMUSCULAR; INTRAVENOUS; SUBCUTANEOUS
Refills: 0 | Status: DISCONTINUED | OUTPATIENT
Start: 2022-01-01 | End: 2022-01-01

## 2022-01-01 RX ORDER — QUETIAPINE FUMARATE 200 MG/1
1 TABLET, FILM COATED ORAL
Qty: 0 | Refills: 0 | DISCHARGE

## 2022-01-01 RX ORDER — ALTEPLASE 100 MG
100 KIT INTRAVENOUS ONCE
Refills: 0 | Status: DISCONTINUED | OUTPATIENT
Start: 2022-01-01 | End: 2022-01-01

## 2022-01-01 RX ORDER — ALTEPLASE 100 MG
80 KIT INTRAVENOUS ONCE
Refills: 0 | Status: DISCONTINUED | OUTPATIENT
Start: 2022-01-01 | End: 2022-01-01

## 2022-01-01 RX ORDER — CHOLECALCIFEROL (VITAMIN D3) 125 MCG
1 CAPSULE ORAL
Qty: 0 | Refills: 0 | DISCHARGE

## 2022-01-01 RX ORDER — TOPIRAMATE 25 MG
100 TABLET ORAL
Refills: 0 | Status: DISCONTINUED | OUTPATIENT
Start: 2022-01-01 | End: 2022-01-01

## 2022-01-01 RX ORDER — ALBUTEROL 90 UG/1
2 AEROSOL, METERED ORAL EVERY 6 HOURS
Refills: 0 | Status: DISCONTINUED | OUTPATIENT
Start: 2022-01-01 | End: 2022-01-01

## 2022-01-01 RX ORDER — URSODIOL 250 MG/1
1 TABLET, FILM COATED ORAL
Qty: 0 | Refills: 0 | DISCHARGE

## 2022-01-01 RX ORDER — CHLORHEXIDINE GLUCONATE 213 G/1000ML
1 SOLUTION TOPICAL ONCE
Refills: 0 | Status: DISCONTINUED | OUTPATIENT
Start: 2022-01-01 | End: 2022-01-01

## 2022-01-01 RX ORDER — SODIUM CHLORIDE 9 MG/ML
1000 INJECTION, SOLUTION INTRAVENOUS
Refills: 0 | Status: DISCONTINUED | OUTPATIENT
Start: 2022-01-01 | End: 2022-01-01

## 2022-01-01 RX ORDER — PREGABALIN 225 MG/1
1 CAPSULE ORAL
Qty: 0 | Refills: 0 | DISCHARGE

## 2022-01-01 RX ADMIN — Medication 50 MILLIGRAM(S): at 23:19

## 2022-01-01 RX ADMIN — Medication 2 MILLIGRAM(S): at 23:07

## 2022-01-01 RX ADMIN — ENOXAPARIN SODIUM 40 MILLIGRAM(S): 100 INJECTION SUBCUTANEOUS at 13:02

## 2022-01-01 RX ADMIN — LEVETIRACETAM 400 MILLIGRAM(S): 250 TABLET, FILM COATED ORAL at 05:13

## 2022-01-01 RX ADMIN — Medication 2 MILLIGRAM(S): at 23:20

## 2022-01-01 RX ADMIN — SODIUM CHLORIDE 150 MILLILITER(S): 9 INJECTION INTRAMUSCULAR; INTRAVENOUS; SUBCUTANEOUS at 10:42

## 2022-01-01 RX ADMIN — SODIUM CHLORIDE 100 MILLILITER(S): 9 INJECTION, SOLUTION INTRAVENOUS at 06:19

## 2022-01-01 RX ADMIN — Medication 25 GRAM(S): at 04:15

## 2022-01-01 RX ADMIN — LEVETIRACETAM 400 MILLIGRAM(S): 250 TABLET, FILM COATED ORAL at 17:14

## 2022-01-14 NOTE — ED PROVIDER NOTE - ATTENDING CONTRIBUTION TO CARE
41-year-old female past medical history of schizophrenia multiple psychiatric disorders seizure disorder brought in by ambulance after 6 ingesting 2 handfuls of Zyprexa.  Patient had an apparent suicide attempt as per family.  Protecting airway rigid VSS responsive to pain. NCAT PERRLA EOMI neck supple non tender normal wob cta bl rrr abdomen s nt nd no rebound no guarding WWPx4 neuro non focal.  Plan is rule out dystonic reaction rule out seizure toxicology consult

## 2022-01-14 NOTE — ED PROVIDER NOTE - CARE PLAN
Principal Discharge DX:	Altered mental status  Secondary Diagnosis:	Malignant neuroleptic syndrome  Secondary Diagnosis:	Antipsychotic overdose   1

## 2022-01-14 NOTE — ED PROVIDER NOTE - CLINICAL SUMMARY MEDICAL DECISION MAKING FREE TEXT BOX
Patient was signed out to me by Dr. Keys pending Tox consult. Patient remained hemodynamically stable in ED. Patient was evaluated by Toxicology, Neurology, and consulted ICU. Patient is admitted to ICU in stable condition.

## 2022-01-14 NOTE — ED PROVIDER NOTE - PROGRESS NOTE DETAILS
TD: Pt given benadryl 50mg x2 ativan x2 with slight improvement in flexed posturing and mental status, still flexed. Consulted toxicology who states they would like to do tele tox consult over video chat. Informed nurse April. I intend to do a tele toxicology consult. TD: Consulted toxicology, tele tox consultation occurred. Per Dr. Acosta and Dr. Roy, recommendation is for monitoring with temperature sensing shannon catheter and end tidal CO2 monitoring; if pt develops hyperthermia then external cooling measures first and foremost followed by dantrolene as second line measure if necessary. Discussed case with Dr. Colvin resident who is covering neurocritical care and regular ICU, he will evaluate pt and let me know if pt will be admitted to neurocrit. TD: Discussed with ICU fellow Ananya who approves pt for icu under [place]. CXR reviewed and called radiology resident for reading.

## 2022-01-14 NOTE — ED ADULT TRIAGE NOTE - CHIEF COMPLAINT QUOTE
BIBA from home after after taking *several* pills of zyprexa  pt unresponsive upon arrival, also seems to be having active seizure in ED   pt direct to crit

## 2022-01-14 NOTE — ED PROVIDER NOTE - OBJECTIVE STATEMENT
Pt is a 41F with a pmhx of schizophrenia, seizure disorder, and asthma BIBA after a witnessed ingestion of 30 tablets of xyprexa. Pt took the tablets in an apparent suicide attempt per family, as per EMS. Pt was initially making groaning sounds per EMS, then progressed into being nonverbal with flexed upper extremities, clenched jaw, and unresponsiveness.

## 2022-01-15 NOTE — CONSULT NOTE ADULT - ATTENDING COMMENTS
s/p neuroleptic overdose  r/o NMS    -neuro checks  -continuous temp monitoring and fever control  -ativan prn for hypertonicity  -assess for rhabdo/lactic acidosis/acid-base disorder/electrolyte disturbances/conduction abnormalities on tele  -supportive care  -if febrile, initiate bromocriptine; if refractory with significant rhabdo, can consider dantrolene   -encephalopathy w/u- cth unrevealing, would obtain vEEG, sepsis w/u, ammonia  -tox f/u    dispo- micu    reconsult as needed
Patient seen and examined and agree with above except as noted.  PAtients history, notes, labs, imaging, vitals and meds reviewed personally.  Patient overdose with zyprexa. poorly responsive only responding to noxious stimuli.  UE flexed at elbows and wrists and fingers.  LE tone appears normal  Pupils 2.5mm and reactive    Plan as above  1. REEG  2. Medical/Toxicology management
I personally evaluated the patient. I reviewed the med tox fellow’s note (as assigned above), and agree with the findings and plan except as documented in my note.    Olanzapine Overdose  - ingested 30 tablets of olanzapine 90 minutes prior to presentation  - patient's extremities held in flexion and is not responsive to verbal or physical stimuli after receiving diphenhydramine and lorazepam  - constellation of symptoms concerning for neuroleptic malignant syndrome  - insert Cotton catheter temperature probe for continuous temperature monitoring  - follow-up CPK and VBG  - administer 2g of magnesium sulfate for QTc prolongation  - admit to MICU and continue with supportive care    -  Patient ventilating and oxygenating  -  Monitor CPK to ensure no massive rise leading to rhabdo  -  Would expect patient to gradually improve over time with metabolism of olanzapine  -  Any unusual progression, or worsening, please call to discuss.    --Will continue to follow. Please call with any further questions    Manuela    [phone] (pager)

## 2022-01-15 NOTE — H&P ADULT - ASSESSMENT
41F with a pmh of schizophrenia, seizure disorder, and asthma BIBA after patient was witnessed to have ingested approximately 30 tablets of Olanzapine (Zyprexa).    #) Zyprexa overdose in setting of suicide attempt. Concern for NMS   - Recommend continuous temperature monitoring (no fevers at this time)   - Follow-up CPK and blood gas   - Acetaminophen and salicylate levels negative.  - Metabolic acidosis noted - will start bicarb drip   - PRN ativan for increasing tone (if severe may require drip)   - If increasing temperature, consider bromocriptine or if severe, dantrolene   - Supportive care in MICU setting  - No need for intubation at this time; airway not compromised  - Will get EEG     #) Hyperkalemia   - Hemolyzed sample; follow-up repeat labs     #) History of schizophrenia   - holding all antipsychotics at this time     #) History of seizures  - Continue home topiramate     #) History of asthma   - PRN albuterol inhaler ordered    #) Diet - regular diet as tolerated  #) DVT prophylaxis - lovenox 40mg sub-q QD  #) Disposition- ICU   #) Activity - increase as tolerated  #) Code status- Full

## 2022-01-15 NOTE — H&P ADULT - HISTORY OF PRESENT ILLNESS
41F with a pmh of schizophrenia, seizure disorder, and asthma BIBA after patient was witnessed to have ingested approximately 30 tablets of Olanzapine (Zyprexa). At time of exam patient is extremely lethargic and unable to provide any history. No family at bedside. As per ED, family stated that this was an apparent suicide attempt. ROS unable to be collected.     In the ED, vitals were /72,  (later in mid 90s), RR 16, T 98.1, SPO2 98% on RA. Toxicology consulted - recommending ICU level of care for possible NMS.     ***MED REC unable to be completed as patient is unable to provide information and listed pharmacy is closed. Attempted preliminary med-rec via sure-scripts.

## 2022-01-15 NOTE — CONSULT NOTE ADULT - ASSESSMENT
Impression:  41F with a pmhx of schizophrenia, seizure disorder, and asthma BIBA after a witnessed ingestion of 30 tablets of xyprexa.She took the tablets in an apparent suicide attempt per family, as per EMS. Pt was initially making groaning sounds per EMS, then progressed into being nonverbal with flexed upper extremities, clenched jaw, and unresponsiveness. Ativan and benadryl given by ED. Toxicology evaluated via telemedicine and they suggest NMS. Discussed with Dr Jose Diaz, instructed to have NCC evaluate. After evaluation NCC recommended MICU admission.     Suggestion:  Routine EEG  Follow toxicology recommendations.   Seizure precautions  Keep magnesium >2  Drug screen   Suggest Q1 neuro checks in ICU setting  Behavorial health evaluation.   Suggest evaluation for 1:1 sit for suicidal ideation.  Impression:  41F with a pmhx of schizophrenia, seizure disorder, and asthma BIBA after a witnessed ingestion of 30 tablets of xyprexa.She took the tablets in an apparent suicide attempt per family, as per EMS. Pt was initially making groaning sounds per EMS, then progressed into being nonverbal with flexed upper extremities, clenched jaw, and unresponsiveness. Ativan and benadryl given by ED. Toxicology evaluated via telemedicine and they suggest NMS. Discussed with Dr Jose Diaz, instructed to have NCC evaluate. After evaluation NCC recommended MICU admission.     Suggestion:  Routine EEG  Follow toxicology recommendations.   Seizure precautions  Keep magnesium >2  Drug screen   Temperature monitoring  Suggest Q1 neuro checks in ICU setting  Behavorial health evaluation.   Suggest evaluation for 1:1 sit for suicidal ideation.

## 2022-01-15 NOTE — H&P ADULT - NSHPLABSRESULTS_GEN_ALL_CORE
LABS:                        11.4   9.90  )-----------( 338      ( 15 Favio 2022 01:52 )             37.0     01-15    140  |  107  |  12  ----------------------------<  121<H>  5.9<H>   |  16<L>  |  0.8    Ca    9.4      15 Favio 2022 01:52  Mg     2.2     01-15    TPro  7.6  /  Alb  4.6  /  TBili  0.3  /  DBili  <0.2  /  AST  33  /  ALT  8   /  AlkPhos  72  01-15      Urinalysis Basic - ( 15 Favio 2022 03:36 )    Color: Colorless / Appearance: Clear / S.007 / pH: x  Gluc: x / Ketone: Negative  / Bili: Negative / Urobili: <2 mg/dL   Blood: x / Protein: Negative / Nitrite: Negative   Leuk Esterase: Negative / RBC: x / WBC x   Sq Epi: x / Non Sq Epi: x / Bacteria: x        Troponin T, Serum: <0.01 ng/mL (01-15-22 @ 01:52)      CARDIAC MARKERS ( 15 Favio 2022 01:52 )  x     / <0.01 ng/mL / x     / x     / x          RADIOLOGY:   CT head   IMPRESSION:    No CT evidence of acute intracranial pathology.

## 2022-01-15 NOTE — CONSULT NOTE ADULT - TELEHEALTH VISIT TYPE
Problem: Respiratory Impairment - Respiratory Therapy 253  Intervention: Inhaled gas administration  Intervention Status  Done  Intervention: Respiratory support devices  Intervention Status  Done  Intervention: Assess respiratory muscle strength/function  Intervention Status  Done         Audio/Video

## 2022-01-15 NOTE — CONSULT NOTE ADULT - TIME BILLING
-  Time spent evaluating, recommendation, reviewing labs and EKG and discussing with ED and inpatient teams.

## 2022-01-15 NOTE — ED ADULT NURSE NOTE - NSIMPLEMENTINTERV_GEN_ALL_ED
Implemented All Fall Risk Interventions:  [place] to call system. Call bell, personal items and telephone within reach. Instruct patient to call for assistance. Room bathroom lighting operational. Non-slip footwear when patient is off stretcher. Physically safe environment: no spills, clutter or unnecessary equipment. Stretcher in lowest position, wheels locked, appropriate side rails in place. Provide visual cue, wrist band, yellow gown, etc. Monitor gait and stability. Monitor for mental status changes and reorient to person, place, and time. Review medications for side effects contributing to fall risk. Reinforce activity limits and safety measures with patient and family.

## 2022-01-15 NOTE — PATIENT PROFILE ADULT - FALL HARM RISK - HARM RISK INTERVENTIONS
Assistance with ambulation/Assistance OOB with selected safe patient handling equipment/Communicate Risk of Fall with Harm to all staff/Monitor gait and stability/Reinforce activity limits and safety measures with patient and family/Sit up slowly, dangle for a short time, stand at bedside before walking/Tailored Fall Risk Interventions/Visual Cue: Yellow wristband and red socks/Bed in lowest position, wheels locked, appropriate side rails in place/Call bell, personal items and telephone in reach/Instruct patient to call for assistance before getting out of bed or chair/Non-slip footwear when patient is out of bed/Welcome to call system/Physically safe environment - no spills, clutter or unnecessary equipment/Purposeful Proactive Rounding/Room/bathroom lighting operational, light cord in reach

## 2022-01-15 NOTE — H&P ADULT - NSHPPHYSICALEXAM_GEN_ALL_CORE
VITALS:   T(F): 98.1  HR: 98  BP: 139/82  RR: 18  SpO2: 99%    PHYSICAL EXAM:  GENERAL: Non-verbal, non-communicative   HEAD: Atraumatic  NECK: Supple  CHEST/LUNG: Poor inspiratory effort  HEART: S1, S2; RRR; No murmurs, rubs, or gallops  ABDOMEN: BS+; Soft, Non-tender, Non-distended  EXTREMITIES:  2+ Peripheral Pulses, No clubbing, cyanosis, or edema  PSYCH: AAOx0  NEUROLOGY: withdraws to severe pain. hands clenched and arms drawn tightly to chest. Increased muscle tone.   SKIN: No rashes or lesions

## 2022-01-15 NOTE — CONSULT NOTE ADULT - SUBJECTIVE AND OBJECTIVE BOX
MEDICAL TOXICOLOGY CONSULT    HPI: Patient is a 41 year old female with past medical history significant for seizure disorder and schizophrenia presents to the Emergency Department after an overdose.  Patient ingested approximately 30 tablets of an unknown dose of olanzapine 90 minutes prior to presentation.  The patient was found unresponsive by family members and EMS was called.  EMS administered 2mg of naloxone without improvement.  Upon arrival, the patient is unresponsive to verbal or physical stimuli.    ONSET / TIME of exposure(s): 90 minutes prior to presentation    QUANTITY of exposure(s): 30 tablets of unknown dose of olanzapine     ROUTE of exposure:  ingestion    CONTEXT of exposure: at home    ASSOCIATED symptoms:    PAST MEDICAL & SURGICAL HISTORY:  Asthma    Seizure  last seizure 1 1/2 yrs ago    Severe obesity (BMI &gt;= 40)    History of appendectomy        FAMILY HISTORY: noncontributory    Vital Signs Last 24 Hrs  T(C): 36.7 (2022 22:23), Max: 36.7 (2022 22:23)  T(F): 98.1 (2022 22:23), Max: 98.1 (2022 22:23)  HR: 158 (2022 22:23) (158 - 158)  BP: 124/72 (2022 22:23) (124/72 - 124/72)  BP(mean): --  RR: 16 (2022 22:23) (16 - 16)  SpO2: 98% (2022 22:23) (98% - 98%)    SIGNIFICANT LABORATORY STUDIES:                        11.4   9.90  )-----------( 338      ( 15 Favio 2022 01:52 )             37.0                         
NEURO ICU CONSULT    EDITH PURDY 41y Female    HPI:  41F with a pmh of schizophrenia, seizure disorder, and asthma BIBA after patient was witnessed to have ingested approximately 30 tablets of Olanzapine (Zyprexa). At time of exam patient is extremely lethargic and unable to provide any history. No family at bedside. As per ED, family stated that this was an apparent suicide attempt. ROS unable to be collected.     In the ED, vitals were /72,  (later in mid 90s), RR 16, T 98.1, SPO2 98% on RA. Toxicology consulted - recommending ICU level of care for possible NMS.     ***MED REC unable to be completed as patient is unable to provide information and listed pharmacy is closed. Attempted preliminary med-rec via sure-scripts.  (15 Favio 2022 04:53)    MEDICATIONS    ALBUTerol    90 MICROgram(s) HFA Inhaler 2 Puff(s) Inhalation every 6 hours PRN  chlorhexidine 4% Liquid 1 Application(s) Topical once  cyanocobalamin 1000 MICROGram(s) Oral daily  dextrose 5% 1000 milliLiter(s) IV Continuous <Continuous>  enoxaparin Injectable 40 milliGRAM(s) SubCutaneous daily  LORazepam   Injectable 2 milliGRAM(s) IV Push every 4 hours PRN  topiramate 100 milliGRAM(s) Oral two times a day  ursodiol Capsule 300 milliGRAM(s) Oral two times a day      PAST MEDICAL & SURGICAL HISTORY:  Asthma    Seizure  last seizure 1 1/2 yrs ago    Severe obesity (BMI &gt;= 40)    Schizophrenia    History of appendectomy             Family history: No history of dementia, strokes, or seizures   FAMILY HISTORY:    SOCIAL HISTORY --     Allergies    Originally Entered as [Unknown] reaction to [DIMOTREX] (Unknown)  penicillin (Anaphylaxis)  Zithromax (Unknown)    Intolerances        Height (cm): 165.1 ( @ 22:23)  Weight (kg): 113.4 ( @ 22:23)  BMI (kg/m2): 41.6 ( @ 22:23)    REVIEW OF SYSTEMS: Unable to obtain    ALTERED MENTAL STATUS; MALIGNANT NEUROLEPTIC SYNDROME; ANTIPSYCHOTIC OVERD    ^ALTERED MENTAL STATUS; MALIGNANT NEUROLEPTIC SYNDROME; ANTIPSYCHOTIC OVERD    MEWS Score    Schizophrenia    Asthma    Seizure    Severe obesity (BMI &gt;= 40)    Schizophrenia    Altered mental status    History of appendectomy        SEIZURE    90+    Malignant neuroleptic syndrome    Antipsychotic overdose    SysAdmin_VisitLink        Vital Signs Last 24 Hrs  T(C): 36.7 (2022 22:23), Max: 36.7 (2022 22:23)  T(F): 98.1 (2022 22:23), Max: 98.1 (2022 22:23)  HR: 98 (15 Favio 2022 03:04) (98 - 158)  BP: 139/82 (15 Favio 2022 03:04) (124/72 - 139/82)  BP(mean): --  RR: 18 (15 Favio 2022 03:04) (16 - 18)  SpO2: 99% (15 Favio 2022 03:04) (98% - 99%)    PHYSICAL EXAMINATION:  General: Well-developed, well nourished. Not responsive to verbal or moderate tactile stimuli. Withdraws to severe pain.   Eyes: Conjunctiva and sclera clear.  Cardiovascular: Regular rate and rhythm; S1 and S2 Normal; No murmurs, gallops or rubs.  Neurologic:  - Mental Status:  Not communicative   - Cranial Nerves II-XII: Unable to assess fully   - Motor:  Increased muscle tone throughout   - Reflexes: diminished overall  - Sensory:  Unable to assess  - Coordination:  Not participatory   - Gait:   Unable to assess    LABS:  CBC Full  -  ( 15 Favio 2022 01:52 )  WBC Count : 9.90 K/uL  RBC Count : 4.46 M/uL  Hemoglobin : 11.4 g/dL  Hematocrit : 37.0 %  Platelet Count - Automated : 338 K/uL  Mean Cell Volume : 83.0 fL  Mean Cell Hemoglobin : 25.6 pg  Mean Cell Hemoglobin Concentration : 30.8 g/dL  Auto Neutrophil # : 4.41 K/uL  Auto Lymphocyte # : 4.27 K/uL  Auto Monocyte # : 0.69 K/uL  Auto Eosinophil # : 0.41 K/uL  Auto Basophil # : 0.11 K/uL  Auto Neutrophil % : 44.6 %  Auto Lymphocyte % : 43.1 %  Auto Monocyte % : 7.0 %  Auto Eosinophil % : 4.1 %  Auto Basophil % : 1.1 %    Urinalysis Basic - ( 15 Favio 2022 03:36 )    Color: Colorless / Appearance: Clear / S.007 / pH: x  Gluc: x / Ketone: Negative  / Bili: Negative / Urobili: <2 mg/dL   Blood: x / Protein: Negative / Nitrite: Negative   Leuk Esterase: Negative / RBC: x / WBC x   Sq Epi: x / Non Sq Epi: x / Bacteria: x      01-15    140  |  107  |  12  ----------------------------<  121<H>  5.9<H>   |  16<L>  |  0.8    Ca    9.4      15 Favio 2022 01:52  Mg     2.2     01-15    TPro  7.6  /  Alb  4.6  /  TBili  0.3  /  DBili  <0.2  /  AST  33  /  ALT  8   /  AlkPhos  72  01-15    Hemoglobin A1C:   Lipid Panel 01-15 @ 01:52  Total Cholesterol, Serum 160  LDL --  Triglycerides --    LIVER FUNCTIONS - ( 15 Favio 2022 01:52 )  Alb: 4.6 g/dL / Pro: 7.6 g/dL / ALK PHOS: 72 U/L / ALT: 8 U/L / AST: 33 U/L / GGT: x           Vitamin B12       
Neurology Consult    Patient is a 41y old  Female who presents with a chief complaint of ams    HPI:  41F with a pmhx of schizophrenia, seizure disorder, and asthma BIBA after a witnessed ingestion of 30 tablets of xyprexa. Pt took the tablets in an apparent suicide attempt per family, as per EMS. Pt was initially making groaning sounds per EMS, then progressed into being nonverbal with flexed upper extremities, clenched jaw, and unresponsiveness    PAST MEDICAL & SURGICAL HISTORY:  Asthma    Seizure  last seizure 1 1/2 yrs ago    Severe obesity (BMI &gt;= 40)    History of appendectomy            FAMILY HISTORY:      Social History: (-) x 3    Allergies    Originally Entered as [Unknown] reaction to [DIMOTREX] (Unknown)  penicillin (Anaphylaxis)  Zithromax (Unknown)    Intolerances        MEDICATIONS  (STANDING):  magnesium sulfate  IVPB 2 Gram(s) IV Intermittent Once    MEDICATIONS  (PRN):        Vital Signs Last 24 Hrs  T(C): 36.7 (2022 22:23), Max: 36.7 (2022 22:23)  T(F): 98.1 (2022 22:23), Max: 98.1 (2022 22:23)  HR: 98 (15 Favio 2022 03:04) (98 - 158)  BP: 139/82 (15 Favio 2022 03:04) (124/72 - 139/82)  BP(mean): --  RR: 18 (15 Favio 2022 03:04) (16 - 18)  SpO2: 99% (15 Favio 2022 03:04) (98% - 99%)    Examination:  Obtunded not following commands  To central noxious stimuli patient grimaces  Bilateral UE flexed with increased tone  Patient with withdrawal to bilateral LE without increased tone  No gaze pupils 2 mm and sluggish  No verbal output.     Labs:   CBC Full  -  ( 15 Favio 2022 01:52 )  WBC Count : 9.90 K/uL  RBC Count : 4.46 M/uL  Hemoglobin : 11.4 g/dL  Hematocrit : 37.0 %  Platelet Count - Automated : 338 K/uL  Mean Cell Volume : 83.0 fL  Mean Cell Hemoglobin : 25.6 pg  Mean Cell Hemoglobin Concentration : 30.8 g/dL  Auto Neutrophil # : 4.41 K/uL  Auto Lymphocyte # : 4.27 K/uL  Auto Monocyte # : 0.69 K/uL  Auto Eosinophil # : 0.41 K/uL  Auto Basophil # : 0.11 K/uL  Auto Neutrophil % : 44.6 %  Auto Lymphocyte % : 43.1 %  Auto Monocyte % : 7.0 %  Auto Eosinophil % : 4.1 %  Auto Basophil % : 1.1 %    01-15    140  |  107  |  12  ----------------------------<  121<H>  5.9<H>   |  16<L>  |  0.8    Ca    9.4      15 Favio 2022 01:52  Mg     2.2     01-15    TPro  7.6  /  Alb  4.6  /  TBili  0.3  /  DBili  <0.2  /  AST  33  /  ALT  8   /  AlkPhos  72  01-15    LIVER FUNCTIONS - ( 15 Favio 2022 01:52 )  Alb: 4.6 g/dL / Pro: 7.6 g/dL / ALK PHOS: 72 U/L / ALT: 8 U/L / AST: 33 U/L / GGT: x                   Neuroimaging:  NCHCT: CT Head No Cont:   ACC: [id number] EXAM:  CT BRAIN                          PROCEDURE DATE:  2022          INTERPRETATION:  CLINICAL HISTORY/REASON FOR EXAM: Seizure.    TECHNIQUE: Multiple contiguous axial CT images of the head were obtained   from the base of the skull to the vertex without administration of   intravenous contrast. Sagittal, coronal and axial reformatted images were   also obtained.    COMPARISON: CT head dated 3/19/2015.    FINDINGS:    The ventricular, basal cisternal and sulcal patterns are appropriate for   patient's stated age.    No evidence of acute intracranial hemorrhage, large territorial infarct,   or significant space-occupying lesion. No extra-axial collections.    No depressed calvarial fracture. Imaged portions of the paranasal sinuses   are clear.      IMPRESSION:    No CT evidence of acute intracranial pathology.    --- End of Report ---          TJ DOTY MD; Resident Radiologist  This document has been electronically signed.  REGAN PHILLIPS MD; Attending Radiologist  This document has been electronically signed. Favio 15 2022  1:20AM (22 @ 23:15)      01-15-22 @ 03:17

## 2022-01-15 NOTE — H&P ADULT - NSICDXPASTMEDICALHX_GEN_ALL_CORE_FT
PAST MEDICAL HISTORY:  Asthma     Schizophrenia     Seizure last seizure 1 1/2 yrs ago    Severe obesity (BMI >= 40)

## 2022-01-15 NOTE — H&P ADULT - ATTENDING COMMENTS
my note supercede resident note    events noted, olanzapine overdose/ obtunded/ intermittently agitated, sp Tox/ Neuro eval  IVF/ repeat cmp/ trend CK. monitor T, if needed levophed/ if extrapyramidal syndrome/ dystonia ( benadryl), 1:1 MICU

## 2022-01-15 NOTE — CONSULT NOTE ADULT - ASSESSMENT
41F with a pmh of schizophrenia, seizure disorder, and asthma BIBA after patient was witnessed to have ingested approximately 30 tablets of Olanzapine (Zyprexa).    #) Concern for NMS   - MICU monitoring; no indication for neuro ICU at this time   - Supportive care  - Monitor for rhabdomyolysis, metabolic acidosis,   - PRN ativan for increasing tone ; benzo drip if needed  - continuous temp monitoring   - If febrile recommend bromocriptine (2.5-5 q6-8). If severe fever, dantrolene (1mg/kg q6)  - Monitor respiratory status; intubation if needed 41F with a pmh of schizophrenia, seizure disorder, and asthma BIBA after patient was witnessed to have ingested approximately 30 tablets of Olanzapine (Zyprexa).    #) Concern for NMS   - MICU monitoring  - Supportive care  - Monitor for rhabdomyolysis, metabolic acidosis  - PRN ativan for increasing tone  - continuous temp monitoring   - If febrile recommend bromocriptine (2.5-5 q6-8). If severe fever/rhabdo/acid-base derangements, dantrolene (1mg/kg q6)  - Monitor respiratory status      dispo MICU

## 2022-01-15 NOTE — CONSULT NOTE ADULT - ASSESSMENT
Patient is a 41 year old female with past medical history significant for seizure disorder and schizophrenia presents to the Emergency Department after an overdose.     1. Olanzapine Overdose  - ingested 30 tablets of olanzapine 90 minutes prior to presentation  - patient's extremities held in flexion and is not responsive to verbal or physical stimuli after receiving diphenhydramine and lorazepam  - constellation of symptoms concerning for neuroleptic malignant syndrome  - insert Cotton catheter temperature probe for continuous temperature monitoring  - follow-up CPK and VBG  - administer 2g of magnesium sulfate for QTc prolongation  - admit to MICU and continue with supportive care

## 2022-01-16 NOTE — BEHAVIORAL HEALTH ASSESSMENT NOTE - NS ED BHA REVIEW OF ED CHART VITAL SIGNS REVIEWED
"3/25/2019      RE: Becky Whipple  [address]  [place] [address]       Dear Colleague,    Thank you for the opportunity to participate in the care of your patient, Becky Whipple, at the [facility] FOR CARDIOVASCULAR DISEASE PREVENTION at [facility]. Please see a copy of my visit note below.    [facility] for Cardiovascular Disease Prevention - Exam Note    Active Problems   Patient Active Problem List    Diagnosis Date Noted     Pterygium eye, left 08/10/2018     Priority: Medium     Hypertension goal BP (blood pressure) < 140/90 01/02/2014     Priority: Medium     Migraine 05/23/2012     Priority: Medium     CARDIOVASCULAR SCREENING; LDL GOAL LESS THAN 160 10/31/2010     Priority: Medium       Reason For Visit   Patient here for [facility] early detection of atherosclerosis and CVD exam.    Pain Evaluation  Current history of pain associated with this visit is: denied    HPI   Becky Whipple is a 52 year old year old female with a history of migraine, hypertension and gestational hypertension.  She is concerned about her CV risk since she had gestational hypertension and now has essential hypertension. She currently feels well with no chest pain or shortness of breath with active walking.She takes hydrochlorothiazide 25 mg per day for hypertension.  No hx of hyperlipidemia. She started fish oil about 6 months ago for knee soreness which seems to help her. Her migraine headaches are less frequent as she ages about once every 1-2 months she will use Imitrex.     Her heart rate runs in the 80's and has been upper range of normal for her for many years. Her mother had valvular heart disease but no family hx of early MI's or CVA.    Nutrition assessment per patient report:   We discussed \"my plate\" and Mediterranean eating patterns and literature was provided.  Foods with fat/cholesterol (fried foods, fatty meats, junk food):  4-5 times per " week, red meat.  Fruits and vegetables (  cup cooked, 1 cup raw):  4 servings per day, 2 oranges and large salad  Caffeine (1 cup coffee, soda, etc):3-  4 servings per day, coffee some sugar  Alcohol servings (12 oz. beer, 4 oz. wine, 1  oz. in mixed drink):  2 servings per week  Calcium servings (dairy foods, 8 oz. milk, yogurt, cheese, ice cream):  1 serving per day, some yogurt  or cheese.  Salt/sodium use:  don't add. tried to avoid processed food.  Special dietary habits:  None    Activity  Patient is active waling about 200 to 250 minutes per week. We discussed adding some upper body strengthening exercise like light weight lifting.     Laboratory Results Review  We discussed laboratory results today including lipids targets and how foods influence cholesterol.    Weight  Her perceived healthy weight is 138 pounds.  A normal BMI of 25 is equal to 150 pounds.  The current BMI of 23.9 is normal weight range.  \    PMH   Past Medical History:   Diagnosis Date     Gestational hypertension      HTN (hypertension)      Joint pain     knee sorness     Migraine        PSH  Past Surgical History:   Procedure Laterality Date     C APPENDECTOMY       C EXCIS UTERINE FIBROID,VAG APPRCH  2008     SURGICAL HISTORY OF -   ,      X2       Current Meds   Current Outpatient Medications   Medication Sig Dispense Refill     calcium carbonate (OS-ASHLIE 500 MG [place]. CA) 1250 MG tablet Take 1 tablet by mouth 2 times daily       fish oil-omega-3 fatty acids 1000 MG capsule Take 2 g by mouth daily       hydrochlorothiazide (HYDRODIURIL) 25 MG tablet Take 1 tablet (25 mg) by mouth daily 90 tablet 3     INTRAROSA 6.5 MG INST Place 1 suppository vaginally daily  3     melatonin 5 MG tablet Take 5-10 mg by mouth nightly as needed for sleep       SUMAtriptan (IMITREX) 50 MG tablet Take 1 tablet (50 mg) by mouth at onset of headache for migraine 15 tablet 3     traZODone (DESYREL) 100 MG tablet Take 1 tablet (100 mg) by  "mouth At Bedtime 90 tablet 3     Vitamin D, Cholecalciferol, 1000 UNITS CAPS          Allergies      Allergies   Allergen Reactions     Nkda [No Known Drug Allergies]        Family Hx   Family History   Problem Relation Age of Onset     Cancer Mother 66        lymphoma     Valvular heart disease Mother         arteries ok, tricup? mitral one valve replaced     Macular Degeneration Father      Hyperlipidemia Father      Hypertension Father      No Known Problems Brother      Cardiovascular Maternal Grandmother         ? heart or stroke     Alcoholism Paternal Grandfather         ? 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 Her job is [employer].  She is  with two daughters ages 20 and 23..     Enjoyment of life is 8 with 10 enjoys life fully.    Tobacco History  History   Smoking Status     Never Smoker   Smokeless Tobacco     Never Used       ROS  CONSTITUTIONAL:  No fever, chills, or sweats. No weight gain/loss.   EENT:  No visual disturbance, ear ache, epistaxis, sore throat  ALLERGIES/IMMUNOLOGIC:  Negative  RESPIRATORY:  No cough, hemoptysis  CARDIOVASCULAR:  As per HPI  GI:  No nausea, vomiting, hematemesis, melena  :  No urinary frequency, dysuria, or hematuria  INTEGUMENT:  Negative  PSYCHIATRIC:  Negative  NEURO:  Negative  ENDOCRINE:  Negative  MUSCULOSKELETAL:  Negative  MUSCULOSKELETAL:  sore knees     Vital Signs   /74 (BP Location: Left arm, Patient Position: Sitting, Cuff Size: Adult Regular)   Pulse 85   Ht 1.651 m (5' 5\")   Wt 64.9 kg (143 lb)   LMP 06/01/2014   SpO2 98%   BMI 23.80 kg/m         Waist: 31 inches  Hip: 37 inches    Physical Exam   In general, the patient is a pleasant female in no apparent distress.    HEENT: NC/AT.  PERRLA.  EOMI.  Sclerae white, not injected.  Nares clear.  Pharynx without erythema or exudate.  Dentition intact.    Neck: No adenopathy.  No thyromegaly.Carotids +4/4 " bilaterally without bruits.  No jugular venous distension.   Lungs: CTA.  No ronchi, wheezes, rales.     Cor: RRR. Normal S1, S2 splits physiologically. No murmur, rub, click, or gallop. The PMI is in the 5th ICS in the midclavicular line. There is no heave.   Abdomen: Soft, nontender, nondistended. No organomegaly.  No bruits.   Extremities: No clubbing, cyanosis, or edema. The pulses are +2/2 at the post-tibial sites bilaterally. No bruits are noted.    Recent Labs  Lab Results   Component Value Date    GLC 87 12/07/2018      Lab Results   Component Value Date    NTBNP 52 03/25/2019     No results found for: NTBNPI   Lab Results   Component Value Date    UCRR 23 12/07/2018      Lab Results   Component Value Date    MICROL <5 12/07/2018      No results found for: MICROALBUMIN   No results found for: CRP   Lab Results   Component Value Date    CHOL 213 (H) 03/25/2019      Lab Results   Component Value Date    TRIG 72 03/25/2019      Lab Results   Component Value Date    HDL 70 03/25/2019      Lab Results   Component Value Date     (H) 03/25/2019      Lab Results   Component Value Date    VLDL 19 03/12/2014      Lab Results   Component Value Date    CHOLHDLRATIO 3.0 03/12/2014     Lab Results   Component Value Date    NHDL 143 (H) 03/25/2019          Joel Test Results    BASIC SPIROMETRY: Summary of two attempts (see printout for details of results)  Results Estimated range for ht/age   FVC: 2.59 liter FVC: 2.89-4.31 liter   FEV1: 2.20 liter FEV1: 2.23-3.43 liter     History of asthma:  NO   History of respiratory infection current/recent:  NO     Spirometry Results:  borderline      WALKING BLOOD PRESSURE RESPONSE (3 minute, 5 MET level walk)   Pre BP: 100/66 mmHg  3 min BP: 114/54 mmHg  1 min post BP: 106/64 mmHg    Pre HR: 86 bpm  3 min HR: 110 bpm  1 min post HR: 87 bpm       RETINAL VASCULAR ASSESSMENT   Left Eye Abnormality:  none  AV Ratio: 0.85    Right Eye Abnormality:  none  AV Ratio: 0.86      Retinal Assessment:  normal      ABDOMINAL AORTA ULTRASOUND (< 2.5 normal, borderline 2.5-2.9, abnormal > 3)   SupraIliac 1.45 cm    SupraRenal 1.65 cm    InfraRenal Proximal 1.68 cm    InfraRenal Distal 1.64 cm      Abdominal Aorta Assessment:  normal      LEFT VENTRICULAR ULTRASOUND MEASUREMENTS (adjusted for BSA)  LVIDD 35.7 mm   Septa 8.8 mm   Posterior 7.3 mm     Left Ventricular US Assessment:  normal      Carotid Artery IMT measurements report and plaques in the small area examined:   Left IMT 0.516 mm  Plaques small heterogeneous plaque in left bulb area size 2.0 and 2.4 mm.     Right IMT 0.645 mm  Plaques small heterogeneous plaque in right bulb area size 1.8 mm.        ECG (see tracing):  normal sinus rhythm;  rate: 77 bpm      Arterial Elasticity per age and gender (see printout):   C1 12.2 mL/mmHg x 10  normal   C2 7.6 mL/mmHg x 100 normal   Supine blood pressure: 120/75 mmHg       Assessment:     Cardiovascular:  ECG normal sinus rhythm rate 77, Heart rate higher with resting BP 85 but typical for her. No chest pain or shortness of breath. No recent migraine but now and then she will use Imitrex. Small carotid plaques (see above) observed in the small area examined with carotid IMT. Hx of hypertension both essential and gestational taking hydrochlorothiazide 25 mg per day. We did discuss CAC consideration if we need more information as to if a statin is warranted. Her mother has valvular heart disease but other wise no early CAD in her family. Father has HTN. We discussed calcium intake for her age is 1200 mg per day and to check how much she is getting in her supplement, should equal about 1000 per day since she has one dairy rich food per day and does well with vegetables/greens. She has not yet had a bone density check.She also takes vitamin D but had not had this checked. Takes vaginal intrarosa a precursor hormone.    Blood Pressure:  Taking hydrochlorothiazide 25 mg per day with normal resting  blood pressure today at  110/74 sitting, 117/79 and 120/75 supine. Creatinine 0.82 12-7-18, k 3.8 GFR 73., normal range renal function labs. Arterial compliance is normal range. She has some frequency of urination for a short period of time after taking hydrochlorothiazide but otherwise is well tolerated.    Lipids:  No hx of hyperlipidemia. LDL at 129 mg/dl with above average HDL of 70. LDL was lower in 2017 at 98 but since then she has added two fish oil for joint discomfort. She will see if she can reduce to one per day and still feel well.    Health Habit Summary:  Nutrition: Heart Healthy Eating:  most of the time   Exercise:  regularly active  Weight:  normal weight range  Tobacco Use:  never used    Full report to follow prevention team review of test results with scanned final report.    Time spent for patient visit was 60 minutes with more than half the time spent on counseling and coordination of care.    SANTIAGO Olmedo CNP      CC  Patient Care Team:  Zenia Perez APRN CNP as PCP - General (Nurse Practitioner)  Zenia Perez APRN CNP as Assigned PCP  SELF, REFERRED      Answers for HPI/ROS submitted by the patient on 3/20/2019   General Symptoms: No  Skin Symptoms: No  HENT Symptoms: No  EYE SYMPTOMS: No  HEART SYMPTOMS: No  LUNG SYMPTOMS: No  INTESTINAL SYMPTOMS: No  URINARY SYMPTOMS: No  GYNECOLOGIC SYMPTOMS: Yes  BREAST SYMPTOMS: No  SKELETAL SYMPTOMS: No  BLOOD SYMPTOMS: No  NERVOUS SYSTEM SYMPTOMS: Yes  MENTAL HEALTH SYMPTOMS: No  Trouble with coordination: No  Dizziness or trouble with balance: No  Fainting or black-out spells: No  Memory loss: No  Headache: Yes  Seizures: No  Speech problems: No  Tingling: No  Tremor: No  Weakness: No  Difficulty walking: No  Paralysis: No  Numbness: No  Bleeding or spotting between periods: No  Heavy or painful periods: No  Irregular periods: No  Vaginal discharge: No  Hot flashes: No  Vaginal dryness: Yes  Genital ulcers:  No  Reduced libido: No  Painful intercourse: No  Difficulty with sexual arousal: No  Post-menopausal bleeding: No         Yes

## 2022-01-16 NOTE — BEHAVIORAL HEALTH ASSESSMENT NOTE - NSBHCONSULTOBSREASON_PSY_A_CORE FT
Patient admitted following overdose of zyprexa medication; impulsive act. Patient not awake enough to provide sufficient history for evaluation.
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
## 2022-01-16 NOTE — PROGRESS NOTE ADULT - ASSESSMENT
IMPRESSION:  antipsychotic OD  apparent suicide  Concern for NMS     SUGGEST:    MICU monitoring  Supportive care  Monitor for rhabdomyolysis, metabolic acidosis  PRN ativan/bromocriptine for increasing tone  continuous temp monitoring  If febrile recommend bromocriptine (2.5-5 q6-8). If severe fever/rhabdo/acid-base derangements, dantrolene (1mg/kg q6)  Monitor respiratory status    will need psych evaluation  1:1      dispo MICU

## 2022-01-16 NOTE — BEHAVIORAL HEALTH ASSESSMENT NOTE - NSBHCHARTREVIEWVS_PSY_A_CORE FT
Vital Signs Last 24 Hrs  T(C): 37.3 (16 Jan 2022 04:00), Max: 37.3 (15 Favio 2022 15:30)  T(F): 99.2 (16 Jan 2022 04:00), Max: 99.2 (16 Jan 2022 04:00)  HR: 77 (16 Jan 2022 10:00) (77 - 102)  BP: 144/88 (16 Jan 2022 10:00) (128/74 - 190/88)  BP(mean): 109 (16 Jan 2022 10:00) (93 - 130)  RR: 17 (16 Jan 2022 10:00) (15 - 33)  SpO2: 100% (16 Jan 2022 10:00) (98% - 100%)

## 2022-01-16 NOTE — CHART NOTE - NSCHARTNOTEFT_GEN_A_CORE
Rapid response was called at 22:52 when patient arrived to CEU after being downgraded from CCU. RN and 1 to 1 sit were in the room when patient became unresponsive and was staring at the ceiling. After approximately one minute the patient had regained consciousness, when rapid response team arrived patient appeared anxious and was sitting up in bed, she expressed that there were too many people in the room and she would like more space. After chart review, patient had an apparent suicide attempt and was witnessed to have ingested approximately 30 tablets of Olanzapine (Zyprexa), after 24 hours of monitoring in the CCU for possible NMS she had been asymptomatic. She had been given two doses of diphenhydramine and ativan was ordered for increased muscle tone. Given the fact that at that point she was awake and oriented and very anxious, it was suspected she was having an episode of acute anxiety (patient noted to be hyperventilating and tachycardic to 160s on monitor) and she was given 2mg IV ativan consistent with the recommendations of psychiatry consultants who had seen the patient earlier that day, a chest xray was ordered and blood gas was drawn.     Immediately following the administration of ativan 2mg IV push, the patient became unresponsive with open nonblinking eyes, bilaterally dilated equal and nonreactive pupils, noted on the monitor was precipitous onset of what initially appeared to be sinus bradycardia, heart rate was 33, she was given 1mg atropine IV stat, but pulse on monitor rapidly transformed into a rhythm highly suspicious for third degree AV block showing characteristic atrial ventricular dissociation. Pacing pads were applied and monitor set to pacing mode, however, unable to capture. The patient immediately became apneic and pulseless thereafter.     Code blue was called at 23:13. CPR initiated, first epi administered team roles assigned and preparations made for endotracheal intubation. Patient was intubated uneventfully and ventilated consistent with ACLS guideline. Initial pulse check revealed asystole/PEA as did all subsequent pulse checks. She was given 19 doses of Epi, 6 bicarbs, D50 insulin, magnesium 1g, 10 units insulin, 5 sodium bicarbs, a TPA bolus of 20mg IV at 12:02, then started on TPA infusion at 80mg/ml at 12:12. After 72 minutes of CPR time of death was called at 12:52. Rapid response was called at 22:52 when patient arrived to CEU after being downgraded from CCU. RN and 1 to 1 sit were in the room when patient became unresponsive and was staring at the ceiling. After approximately one minute the patient had regained consciousness, when rapid response team arrived patient appeared anxious and was sitting up in bed, she expressed that there were too many people in the room and she would like more space. After chart review, patient had an apparent suicide attempt and was witnessed to have ingested approximately 30 tablets of Olanzapine (Zyprexa), after 24 hours of monitoring in the CCU for possible NMS she had been asymptomatic. She had been given two doses of diphenhydramine and ativan was ordered for increased muscle tone. Given the fact that at that point she was awake and oriented and very anxious, it was suspected she was having an episode of acute anxiety (patient noted to be hyperventilating and tachycardic to 160s on monitor) and she was given 2mg IV ativan consistent with the recommendations of psychiatry consultants who had seen the patient earlier that day, a chest xray, blood gas, and ekg were ordered. She was combative yelling and pushing away staff thus EKG and ABG could not be performed immediately     Immediately following the administration of ativan 2mg IV push, the patient became unresponsive with open nonblinking eyes, bilaterally dilated equal and nonreactive pupils, HR had initially improved to ~65 sinus rhythm on tele. Shortly after there was a precipitous onset of what initially appeared to be sinus bradycardia, heart rate was 33, she was given 1mg atropine IV stat, but pulse on monitor rapidly transformed into a rhythm highly suspicious for third degree AV block showing characteristic atrial ventricular dissociation. Pacing pads were applied and monitor set to pacing mode, however, unable to capture. The patient immediately became apneic and pulseless thereafter.     Code blue was called at 23:13. CPR initiated, first epi administered team roles assigned and preparations made for endotracheal intubation. Patient was intubated uneventfully and ventilated consistent with ACLS guideline. Initial pulse check revealed asystole/PEA as did all subsequent pulse checks. She was given 19 doses of Epi, 6 bicarbs, D50 insulin, magnesium 1g, 10 units insulin, a TPA bolus of 20mg IV at 12:02, then started on TPA infusion at 80mg/ml at 12:12. After 72 minutes of CPR time of death was called at 12:52.    Attending attestation:   Agree with the documentation above. Rapid initially called for a period of unresponsiveness with sudden return of mental status. Though was notably agitated, tachypneic, and tachycardic. She was pulling and tugging at lines, swatting staff away and asking for everyone to back up and give her space. She was unable to verbalize any symptoms other than being hot/flushed, despite being asked about chest pain or shortness of breath.   She allowed me to close enough to examine her: moving air in her b/l lungs, tachypneic but not wheezing or crackling, tachycardic but no murmurs, no significant leg edema, abdomen was soft.   When asked if she felt like she was having panic attack she said yes but otherwise couldn't provide any other information. 2mg of ativan order to help calm her so the rest of the work up could be performed.   Ativan given, HR improved to the 65 but sinus on tele, then she had a sudden episode again of unresponsiveness, pupils fixed and dilated. BP cycles and was ~121/54. Then within minutes became bradycardic and tele showing complete heart block, Atropine 1mg given almost immediately with no improvement while pacing/defib pads were being prepared for placement. External pacing started but no clear capture. Flaccid muscle tone, unable to get BP reading, and pulseless. Code blue called immediately, events as described above. Intubate at bedside with out complication, no aspirate appreciated on suctioning nor glide-o-scope. Pharmacy called and alerted to prep TPA should it need to be given.    Reached out to CT surgery regarding possible need for ECMO should we regain ROSC but she never regained a pulse.   ICU team made aware for possible upgrade.  Contacted cardio fellow on call for bedside echo. We had concern for possible massive PE, to assess for right sided heart stain. Ultimately echo was not performed because we could never stop CPR.   TPA bolus and infusion was given due to emergent and unclear nature of her arrest, which would also simultaneously treat an acute massive MI. Ultimately after ~70 minutes of coding, 20 mins post TPA no pulse was regained. Her time of death was 12:25am. He family was notified. Rapid response was called at 22:52 when patient arrived to CEU after being downgraded from CCU. RN and 1 to 1 sit were in the room when patient became unresponsive and was staring at the ceiling. After approximately one minute the patient had regained consciousness, when rapid response team arrived patient appeared anxious and was sitting up in bed, she expressed that there were too many people in the room and she would like more space. After chart review, patient had an apparent suicide attempt and was witnessed to have ingested approximately 30 tablets of Olanzapine (Zyprexa), after 24 hours of monitoring in the CCU for possible NMS she had been asymptomatic. She had been given two doses of diphenhydramine and ativan was ordered for increased muscle tone. Given the fact that at that point she was awake and oriented and very anxious, it was suspected she was having an episode of acute anxiety (patient noted to be hyperventilating and tachycardic to 160s on monitor) and she was given 2mg IV ativan consistent with the recommendations of psychiatry consultants who had seen the patient earlier that day, a chest xray, blood gas, and ekg were ordered. She was combative yelling and pushing away staff thus EKG and ABG could not be performed immediately     Immediately following the administration of ativan 2mg IV push, the patient became unresponsive with open nonblinking eyes, bilaterally dilated equal and nonreactive pupils, HR had initially improved to ~65 sinus rhythm on tele. Shortly after there was a precipitous onset of what initially appeared to be sinus bradycardia, heart rate was 33, she was given 1mg atropine IV stat, but pulse on monitor rapidly transformed into a rhythm highly suspicious for third degree AV block showing characteristic atrial ventricular dissociation. Pacing pads were applied and monitor set to pacing mode, however, unable to capture. The patient immediately became apneic and pulseless thereafter.     Code blue was called at 23:13. CPR initiated, first epi administered team roles assigned and preparations made for endotracheal intubation. Patient was intubated uneventfully and ventilated consistent with ACLS guideline. Initial pulse check revealed asystole/PEA as did all subsequent pulse checks. She was given 19 doses of Epi, 6 bicarbs, D50 insulin, magnesium 1g, 10 units insulin, a TPA bolus of 20mg IV at 12:02, then started on TPA infusion at 80mg/ml at 12:12. After 72 minutes of CPR time of death was called at 12:25.    Attending attestation:   Agree with the documentation above. Rapid initially called for a period of unresponsiveness with sudden return of mental status. Though was notably agitated, tachypneic, and tachycardic. She was pulling and tugging at lines, swatting staff away and asking for everyone to back up and give her space. She was unable to verbalize any symptoms other than being hot/flushed, despite being asked about chest pain or shortness of breath.   She allowed me to close enough to examine her: moving air in her b/l lungs, tachypneic but not wheezing or crackling, tachycardic but no murmurs, no significant leg edema, abdomen was soft.   When asked if she felt like she was having panic attack she said yes but otherwise couldn't provide any other information. 2mg of ativan order to help calm her so the rest of the work up could be performed.   Ativan given, HR improved to the 65 but sinus on tele, then she had a sudden episode again of unresponsiveness, pupils fixed and dilated. BP cycles and was ~121/54. Then within minutes became bradycardic and tele showing complete heart block, Atropine 1mg given almost immediately with no improvement while pacing/defib pads were being prepared for placement. External pacing started but no clear capture. Flaccid muscle tone, unable to get BP reading, and pulseless. Code blue called immediately, events as described above. Intubate at bedside with out complication, no aspirate appreciated on suctioning nor glide-o-scope. Pharmacy called and alerted to prep TPA should it need to be given.    Reached out to CT surgery regarding possible need for ECMO should we regain ROSC but she never regained a pulse.   ICU team made aware for possible upgrade.  Contacted cardio fellow on call for bedside echo. We had concern for possible massive PE, to assess for right sided heart stain. Ultimately echo was not performed because we could never stop CPR.   TPA bolus and infusion was given due to emergent and unclear nature of her arrest, which would also simultaneously treat an acute massive MI. Ultimately after ~70 minutes of coding, 20 mins post TPA no pulse was regained. Her time of death was 12:25am. He family was notified.
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
## 2022-01-16 NOTE — CHART NOTE - NSCHARTNOTEFT_GEN_A_CORE
CCU Transfer Note    Transfer from: CCU  Transfer to:  (  ) Medicine    (  ) Telemetry    (  ) RCU    (  ) Palliative    (  ) Stroke Unit    ( X ) stepdown  Accepting physician:    MEDICATIONS:  STANDING MEDICATIONS  chlorhexidine 4% Liquid 1 Application(s) Topical once  cyanocobalamin Injectable 1000 MICROGram(s) IntraMuscular daily  enoxaparin Injectable 40 milliGRAM(s) SubCutaneous daily  levETIRAcetam  IVPB 500 milliGRAM(s) IV Intermittent every 12 hours  sodium chloride 0.9%. 1000 milliLiter(s) IV Continuous <Continuous>  ursodiol Capsule 300 milliGRAM(s) Oral two times a day    PRN MEDICATIONS  ALBUTerol    90 MICROgram(s) HFA Inhaler 2 Puff(s) Inhalation every 6 hours PRN  LORazepam   Injectable 2 milliGRAM(s) IV Push every 4 hours PRN      VITAL SIGNS: Last 24 Hours  T(C): 37.3 (2022 12:00), Max: 37.3 (15 Favio 2022 15:30)  T(F): 99.1 (2022 12:00), Max: 99.2 (2022 04:00)  HR: 73 (2022 12:00) (73 - 102)  BP: 126/79 (2022 12:00) (126/79 - 190/88)  BP(mean): 99 (2022 12:00) (93 - 130)  RR: 19 (2022 12:00) (15 - 33)  SpO2: 100% (2022 12:00) (98% - 100%)    LABS:                        10.7   8.48  )-----------( 305      ( 15 Favio 2022 08:42 )             33.4     01-15    141  |  111<H>  |  7<L>  ----------------------------<  110<H>  4.8   |  18  |  0.5<L>    Ca    8.9      15 Favio 2022 08:42  Mg     2.0     -15    TPro  6.2  /  Alb  4.0  /  TBili  0.4  /  DBili  x   /  AST  17  /  ALT  6   /  AlkPhos  64  -15      Urinalysis Basic - ( 15 Favio 2022 03:36 )    Color: Colorless / Appearance: Clear / S.007 / pH: x  Gluc: x / Ketone: Negative  / Bili: Negative / Urobili: <2 mg/dL   Blood: x / Protein: Negative / Nitrite: Negative   Leuk Esterase: Negative / RBC: x / WBC x   Sq Epi: x / Non Sq Epi: x / Bacteria: x      ABG - ( 15 Favio 2022 10:12 )  pH, Arterial: 7.41  pH, Blood: x     /  pCO2: 36    /  pO2: 109   / HCO3: 23    / Base Excess: -1.5  /  SaO2: 100.0               CARDIAC MARKERS ( 15 Favio 2022 12:00 )  x     / x     / 114 U/L / x     / x      CARDIAC MARKERS ( 15 Favio 2022 01:52 )  x     / <0.01 ng/mL / x     / x     / x          RADIOLOGY:    CCU COURSE:  41F with a pmh of schizophrenia, seizure disorder, and asthma BIBA after patient was witnessed to have ingested approximately 30 tablets of Olanzapine (Zyprexa). At time of exam patient is extremely lethargic and unable to provide any history. No family at bedside. As per ED, family stated that this was an apparent suicide attempt. ROS unable to be collected. In the ED, vitals were /72,  (later in mid 90s), RR 16, T 98.1, SPO2 98% on RA. Toxicology consulted - recommending ICU level of care for possible NMS.  pt is asymptomatic in the CCU and has been stable for >24 hours. pt will need psych evaluation and likely psych admission once medically cleared.           ASSESSMENT & PLAN:    Zyprexa overdose in setting of suicide attempt. Concern for NMS   - Recommend continuous temperature monitoring (no fevers at this time)   - Acetaminophen and salicylate levels negative.  - Metabolic acidosis noted - will start bicarb drip   - PRN ativan for increasing tone (if severe may require drip)   - If increasing temperature, consider bromocriptine or if severe, dantrolene     #) History of schizophrenia   - holding all antipsychotics at this time     #) History of seizures  - Continue home topiramate     #) History of asthma   - PRN albuterol inhaler ordered      For Follow-Up:  follow up abg and CK levels  medical clearance for psych--> psych inpatient likely

## 2022-01-16 NOTE — AIRWAY PLACEMENT NOTE ADULT - MEDICATIONS GIVEN TO FACILATATE INTUBATION
none; Dr. Lou intubated patient, confirmation of ETT by CRNA none; Dr. Vizcarra intubated patient, confirmation of ETT by CRNA

## 2022-01-16 NOTE — BEHAVIORAL HEALTH ASSESSMENT NOTE - VIOLENCE RISK FACTORS:
Antisocial behavior/cognition (past or present)/History of being victimized/traumatized/History of violation of a legal mandate (e.g., parole, probation, AOT)

## 2022-01-16 NOTE — BEHAVIORAL HEALTH ASSESSMENT NOTE - OTHER
unable to formally assess one word responses; unable to formally assess NA cooperative when awake; but patient mostly lethargic deferred

## 2022-01-16 NOTE — BEHAVIORAL HEALTH ASSESSMENT NOTE - NSBHCHARTREVIEWINVESTIGATE_PSY_A_CORE FT
< from: 12 Lead ECG (01.15.22 @ 15:25) >      Ventricular Rate 91 BPM    Atrial Rate 91 BPM    P-R Interval 148 ms    QRS Duration 68 ms    Q-T Interval 366 ms    QTC Calculation(Bazett) 450 ms    P Axis 74 degrees    R Axis 9 degrees    T Axis 33 degrees    Diagnosis Line Normal sinus rhythm  Possible Left atrial enlargement  Low voltage QRS  Cannot rule out Anterior infarct , age undetermined  Abnormal ECG    Confirmed by Alfred Hagen ([id number]) on 1/16/2022 12:52:24 PM
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
## 2022-01-16 NOTE — PROGRESS NOTE ADULT - SUBJECTIVE AND OBJECTIVE BOX
Patient is a 41y old  Female who presents with a chief complaint of Overdose/suicide attempt (15 Favio 2022 05:19)        HPI:  41F with a pmh of schizophrenia, seizure disorder, and asthma BIBA after patient was witnessed to have ingested approximately 30 tablets of Olanzapine (Zyprexa).   At time of exam patient is extremely lethargic and unable to provide any history. No family at bedside.   As per ED, family stated that this was an apparent suicide attempt. ROS unable to be collected.     In the ED, vitals were /72,  (later in mid 90s), RR 16, T 98.1, SPO2 98% on RA.   Toxicology consulted - recommending ICU level of care for possible NMS.     ***MED REC unable to be completed as patient is unable to provide information and listed pharmacy is closed. Attempted preliminary med-rec via sure-scripts.  (15 Favio 2022 04:53)      Pt evaluated on rounds.  I reviewed the radiology tests and hospital record.    I reviewed previous notes on this patient.    Interval Events: No overnight events.      CAM:+        REVIEW OF SYSTEMS:   see HPI      OBJECTIVE:  ICU Vital Signs Last 24 Hrs  T(C): 37.3 (2022 04:00), Max: 37.3 (15 Favio 2022 15:30)  T(F): 99.2 (2022 04:00), Max: 99.2 (2022 04:00)  HR: 96 (2022 06:00) (83 - 102)  BP: 172/96 (2022 06:00) (128/74 - 190/88)  BP(mean): 130 (2022 06:00) (93 - 130)    RR: 33 (2022 06:00) (15 - 33)  SpO2: 100% (2022 06:00) (98% - 100%)        01-15 @ 07:  -  16 @ 07:00  --------------------------------------------------------  IN: 1500 mL / OUT: 375 mL / NET: 1125 mL     @ 07:  -   @ 08:56  --------------------------------------------------------  IN: 150 mL / OUT: 30 mL / NET: 120 mL      CAPILLARY BLOOD GLUCOSE      POCT Blood Glucose.: 85 mg/dL (2022 06:01)        PHYSICAL EXAM:         · ENMT:   Airway patent,   Nasal mucosa clear.  Mouth with normal mucosa.   No thrush    · EYES:   Clear bilaterally,   pupils equal,   round and reactive to light.    · CARDIAC:   Normal rate,   regular rhythm.    Heart sounds S1, S2.   No murmurs, no rubs or gallops on auscultation  no edema        CAROTID:   normal systolic impulse  no bruits    · RESPIRATORY:   rales  normal chest expansion  no retractions or use of accessory muscles  palpation of chest is normal with no fremitus  percussion of chest demonstrates no hyperresonance or dullness    · GASTROINTESTINAL:  Abdomen soft,   non-tender,   + BS  liver/spleen not palpable    · MUSCULOSKELETAL:   no clubbing, cyanosis      · SKIN:   Skin normal color for race,   warm, dry   No evidence of rash.        · HEME LYMPH:   no splenomegaly.  No cervical  lymphadenopathy.  no inguinal lymphadenopathy    HOSPITAL MEDICATIONS:  MEDICATIONS  (STANDING):  chlorhexidine 4% Liquid 1 Application(s) Topical once  cyanocobalamin Injectable 1000 MICROGram(s) IntraMuscular daily  enoxaparin Injectable 40 milliGRAM(s) SubCutaneous daily  levETIRAcetam  IVPB 500 milliGRAM(s) IV Intermittent every 12 hours  sodium chloride 0.9%. 1000 milliLiter(s) (150 mL/Hr) IV Continuous <Continuous>  ursodiol Capsule 300 milliGRAM(s) Oral two times a day    MEDICATIONS  (PRN):  ALBUTerol    90 MICROgram(s) HFA Inhaler 2 Puff(s) Inhalation every 6 hours PRN Shortness of Breath and/or Wheezing  LORazepam   Injectable 2 milliGRAM(s) IV Push every 4 hours PRN Increasing Muscle tone    sodium chloride 0.9%.: Solution, 1000 milliLiter(s) infuse at 150 mL/Hr      LABS:                        10.7   8.48  )-----------( 305      ( 15 Favio 2022 08:42 )             33.4     01-15    141  |  111<H>  |  7<L>  ----------------------------<  110<H>  4.8   |  18  |  0.5<L>    Ca    8.9      15 Favio 2022 08:42  Mg     2.0     01-15    TPro  6.2  /  Alb  4.0  /  TBili  0.4  /  DBili  x   /  AST  17  /  ALT  6   /  AlkPhos  64  01-15      Urinalysis Basic - ( 15 Favio 2022 03:36 )    Color: Colorless / Appearance: Clear / S.007 / pH: x  Gluc: x / Ketone: Negative  / Bili: Negative / Urobili: <2 mg/dL   Blood: x / Protein: Negative / Nitrite: Negative   Leuk Esterase: Negative / RBC: x / WBC x   Sq Epi: x / Non Sq Epi: x / Bacteria: x      Arterial Blood Gas:  01-15 @ 10:12  7.41/36/109/23/100.0/-1.5  ABG lactate: --        CARDIAC MARKERS ( 15 Favio 2022 12:00 )  x     / x     / 114 U/L / x     / x      CARDIAC MARKERS ( 15 Favio 2022 01:52 )  x     / <0.01 ng/mL / x     / x     / x          COVID-19 PCR: NotDetec (15 Favio 2022 04:33)        ABG - ( 15 Favio 2022 10:12 )  pH, Arterial: 7.41  pH, Blood: x     /  pCO2: 36    /  pO2: 109   / HCO3: 23    / Base Excess: -1.5  /  SaO2: 100.0               RADIOLOGY: Today I personally interpreted the latest CXR and other pertinent films.

## 2022-01-16 NOTE — BEHAVIORAL HEALTH ASSESSMENT NOTE - NSBHCHARTREVIEWLAB_PSY_A_CORE FT
CBC Full  -  ( 15 Favio 2022 08:42 )  WBC Count : 8.48 K/uL  RBC Count : 4.11 M/uL  Hemoglobin : 10.7 g/dL  Hematocrit : 33.4 %  Platelet Count - Automated : 305 K/uL  Mean Cell Volume : 81.3 fL  Mean Cell Hemoglobin : 26.0 pg  Mean Cell Hemoglobin Concentration : 32.0 g/dL  Auto Neutrophil # : 5.96 K/uL  Auto Lymphocyte # : 1.70 K/uL  Auto Monocyte # : 0.57 K/uL  Auto Eosinophil # : 0.17 K/uL  Auto Basophil # : 0.05 K/uL  Auto Neutrophil % : 70.3 %  Auto Lymphocyte % : 20.0 %  Auto Monocyte % : 6.7 %  Auto Eosinophil % : 2.0 %  Auto Basophil % : 0.6 %    01-15    141  |  111<H>  |  7<L>  ----------------------------<  110<H>  4.8   |  18  |  0.5<L>    Ca    8.9      15 Favio 2022 08:42  Mg     2.0     01-15    TPro  6.2  /  Alb  4.0  /  TBili  0.4  /  DBili  x   /  AST  17  /  ALT  6   /  AlkPhos  64  01-15    Creatine Kinase, Serum: 114 U/L (01.15.22 @ 12:00)

## 2022-01-17 NOTE — DISCHARGE NOTE FOR THE EXPIRED PATIENT - HOSPITAL COURSE
41F with a pmh of schizophrenia, seizure disorder, and asthma BIBA after patient was witnessed to have ingested approximately 30 tablets of Olanzapine (Zyprexa). At time of exam patient was extremely lethargic and unable to provide any history. No family at bedside. As per ED, family stated that this was an apparent suicide attempt. ROS unable to be collected. In the ED, vitals were /72,  (later in mid 90s), RR 16, T 98.1, SPO2 98% on RA. Toxicology consulted - recommending ICU level of care for possible NMS.  pt was asymptomatic in the CCU and was stable for >24 hours.     On 1/16/22 rapid response was called at 22:52 when patient arrived to CEU after being downgraded from CCU. RN and 1 to 1 sit were in the room when patient became unresponsive and was staring at the ceiling. After approximately one minute the patient had regained consciousness, when rapid response team arrived patient appeared anxious and was sitting up in bed, she expressed that there were too many people in the room and she would like more space. After chart review, patient had an apparent suicide attempt and was witnessed to have ingested approximately 30 tablets of Olanzapine (Zyprexa), after 24 hours of monitoring in the CCU for possible NMS she had been asymptomatic. She had been given two doses of diphenhydramine and ativan was ordered for increased muscle tone. Given the fact that at that point she was awake and oriented and very anxious, it was suspected she was having an episode of acute anxiety (patient noted to be hyperventilating and tachycardic to 160s on monitor) and she was given 2mg IV ativan consistent with the recommendations of psychiatry consultants who had seen the patient earlier that day, a chest xray was ordered and blood gas was drawn.     Immediately following the administration of ativan 2mg IV push, the patient became unresponsive with open nonblinking eyes, bilaterally dilated equal and nonreactive pupils, noted on the monitor was precipitous onset of what initially appeared to be sinus bradycardia, heart rate was 33, she was given 1mg atropine IV stat, but pulse on monitor rapidly transformed into a rhythm highly suspicious for third degree AV block showing characteristic atrial ventricular dissociation. Pacing pads were applied and monitor set to pacing mode, however, unable to capture. The patient immediately became apneic and pulseless thereafter.     Code blue was called at 23:13. CPR initiated, first epi administered team roles assigned and preparations made for endotracheal intubation. Patient was intubated uneventfully and ventilated consistent with ACLS guideline. Initial pulse check revealed asystole/PEA as did all subsequent pulse checks. She was given 19 doses of Epi, 6 bicarbs, D50 insulin, magnesium 1g, 10 units insulin, 5 sodium bicarbs, a TPA bolus of 20mg IV at 12:02, then started on TPA infusion at 80mg/ml at 12:12. After 72 minutes of CPR time of death was called at 12:52, 1/17/22.   41F with a pmh of schizophrenia, seizure disorder, and asthma BIBA after patient was witnessed to have ingested approximately 30 tablets of Olanzapine (Zyprexa). At time of exam patient was extremely lethargic and unable to provide any history. No family at bedside. As per ED, family stated that this was an apparent suicide attempt. ROS unable to be collected. In the ED, vitals were /72,  (later in mid 90s), RR 16, T 98.1, SPO2 98% on RA. Toxicology consulted - recommending ICU level of care for possible NMS.  pt was asymptomatic in the CCU and was stable for >24 hours.     On 1/16/22 rapid response was called at 22:52 when patient arrived to CEU after being downgraded from CCU. RN and 1 to 1 sit were in the room when patient became unresponsive and was staring at the ceiling. After approximately one minute the patient had regained consciousness, when rapid response team arrived patient appeared anxious and was sitting up in bed, she expressed that there were too many people in the room and she would like more space. After chart review, patient had an apparent suicide attempt and was witnessed to have ingested approximately 30 tablets of Olanzapine (Zyprexa), after 24 hours of monitoring in the CCU for possible NMS she had been asymptomatic. She had been given two doses of diphenhydramine and ativan was ordered for increased muscle tone. Given the fact that at that point she was awake and oriented and very anxious, it was suspected she was having an episode of acute anxiety (patient noted to be hyperventilating and tachycardic to 160s on monitor) and she was given 2mg IV ativan consistent with the recommendations of psychiatry consultants who had seen the patient earlier that day, a chest xray, blood gas, and ekg were ordered. She was combative yelling and pushing away staff thus EKG and ABG could not be performed immediately.    Immediately following the administration of ativan 2mg IV push, the patient became unresponsive with open nonblinking eyes, bilaterally dilated equal and nonreactive pupils, noted on the monitor was precipitous onset of what initially appeared to be sinus bradycardia, heart rate was 33, she was given 1mg atropine IV stat, but pulse on monitor rapidly transformed into a rhythm highly suspicious for third degree AV block showing characteristic atrial ventricular dissociation. Pacing pads were applied and monitor set to pacing mode, however, unable to capture. The patient immediately became apneic and pulseless thereafter.     Code blue was called at 23:13. CPR initiated, first epi administered team roles assigned and preparations made for endotracheal intubation. Patient was intubated uneventfully and ventilated consistent with ACLS guideline. Initial pulse check revealed asystole/PEA as did all subsequent pulse checks. She was given 19 doses of Epi, 6 bicarbs, D50 insulin, magnesium 1g, 10 units insulin, 5 sodium bicarbs, a TPA bolus of 20mg IV at 12:02, then started on TPA infusion at 80mg/ml at 12:12. After 72 minutes of CPR time of death was called at 12:52, 1/17/22.   41F with a pmh of schizophrenia, seizure disorder, and asthma BIBA after patient was witnessed to have ingested approximately 30 tablets of Olanzapine (Zyprexa). At time of exam patient was extremely lethargic and unable to provide any history. No family at bedside. As per ED, family stated that this was an apparent suicide attempt. ROS unable to be collected. In the ED, vitals were /72,  (later in mid 90s), RR 16, T 98.1, SPO2 98% on RA. Toxicology consulted - recommending ICU level of care for possible NMS.  pt was asymptomatic in the CCU and was stable for >24 hours.     On 1/16/22 rapid response was called at 22:52 when patient arrived to CEU after being downgraded from CCU. RN and 1 to 1 sit were in the room when patient became unresponsive and was staring at the ceiling. After approximately one minute the patient had regained consciousness, when rapid response team arrived patient appeared anxious and was sitting up in bed, she expressed that there were too many people in the room and she would like more space. After chart review, patient had an apparent suicide attempt and was witnessed to have ingested approximately 30 tablets of Olanzapine (Zyprexa), after 24 hours of monitoring in the CCU for possible NMS she had been asymptomatic. She had been given two doses of diphenhydramine and ativan was ordered for increased muscle tone. Given the fact that at that point she was awake and oriented and very anxious, it was suspected she was having an episode of acute anxiety (patient noted to be hyperventilating and tachycardic to 160s on monitor) and she was given 2mg IV ativan consistent with the recommendations of psychiatry consultants who had seen the patient earlier that day, a chest xray, blood gas, and ekg were ordered. She was combative yelling and pushing away staff thus EKG and ABG could not be performed immediately.    Immediately following the administration of ativan 2mg IV push, the patient became unresponsive with open nonblinking eyes, bilaterally dilated equal and nonreactive pupils, noted on the monitor was precipitous onset of what initially appeared to be sinus bradycardia, heart rate was 33, she was given 1mg atropine IV stat, but pulse on monitor rapidly transformed into a rhythm highly suspicious for third degree AV block showing characteristic atrial ventricular dissociation. Pacing pads were applied and monitor set to pacing mode, however, unable to capture. The patient immediately became apneic and pulseless thereafter.     Code blue was called at 23:13. CPR initiated, first epi administered team roles assigned and preparations made for endotracheal intubation. Patient was intubated uneventfully and ventilated consistent with ACLS guideline. Initial pulse check revealed asystole/PEA as did all subsequent pulse checks. She was given 19 doses of Epi, 6 bicarbs, D50 insulin, magnesium 1g, 10 units insulin, 5 sodium bicarbs, a TPA bolus of 20mg IV at 12:02, then started on TPA infusion at 80mg/ml at 12:12. After 72 minutes of CPR time of death was called at 12:25, 1/17/22.   41F with a pmh of schizophrenia, seizure disorder, and asthma BIBA on 1/14/22 after patient was witnessed to have ingested approximately 30 tablets of Olanzapine (Zyprexa). At time of exam patient was extremely lethargic and unable to provide any history. No family at bedside. As per ED, family stated that this was an apparent suicide attempt. ROS unable to be collected. In the ED, vitals were /72,  (later in mid 90s), RR 16, T 98.1, SPO2 98% on RA. Toxicology consulted - recommending ICU level of care for possible NMS. Patient was asymptomatic in the CCU and was stable for more than 24 hours so she was downgraded to CEU on 1/16.     On 1/16/22 rapid response was called at 22:52 when patient arrived to CEU after being downgraded from CCU. RN and 1 to 1 sit were in the room when patient became unresponsive and was staring at the ceiling. After approximately one minute the patient had regained consciousness, when rapid response team arrived patient appeared anxious and was sitting up in bed, she expressed that there were too many people in the room and she would like more space. After chart review, patient had an apparent suicide attempt and was witnessed to have ingested approximately 30 tablets of Olanzapine (Zyprexa), after 24 hours of monitoring in the CCU for possible NMS she had been asymptomatic. She had been given two doses of diphenhydramine and ativan was ordered for increased muscle tone. Given the fact that at that point she was awake and oriented and very anxious, it was suspected she was having an episode of acute anxiety (patient noted to be hyperventilating and tachycardic to 160s on monitor) and she was given 2mg IV ativan consistent with the recommendations of psychiatry consultants who had seen the patient earlier that day, a chest xray, blood gas, and ekg were ordered. She was combative yelling and pushing away staff thus EKG and ABG could not be performed immediately.    Immediately following the administration of ativan 2mg IV push, the patient became unresponsive with open nonblinking eyes, bilaterally dilated equal and nonreactive pupils, noted on the monitor was precipitous onset of what initially appeared to be sinus bradycardia, heart rate was 33, she was given 1mg atropine IV stat, but pulse on monitor rapidly transformed into a rhythm highly suspicious for third degree AV block showing characteristic atrial ventricular dissociation. Pacing pads were applied and monitor set to pacing mode, however, unable to capture. The patient immediately became apneic and pulseless thereafter.     Code blue was called at 23:13. CPR initiated, first epi administered team roles assigned and preparations made for endotracheal intubation. Patient was intubated uneventfully and ventilated consistent with ACLS guideline. Initial pulse check revealed asystole/PEA as did all subsequent pulse checks. She was given 19 doses of Epi, 6 bicarbs, D50 insulin, magnesium 1g, 10 units insulin, 5 sodium bicarbs, a TPA bolus of 20mg IV at 12:02, then started on TPA infusion at 80mg/ml at 12:12. After 72 minutes of CPR time of death was called at 12:25, 1/17/22.

## 2022-01-22 DIAGNOSIS — J45.909 UNSPECIFIED ASTHMA, UNCOMPLICATED: ICD-10-CM

## 2022-01-22 DIAGNOSIS — R00.0 TACHYCARDIA, UNSPECIFIED: ICD-10-CM

## 2022-01-22 DIAGNOSIS — Z62.810 PERSONAL HISTORY OF PHYSICAL AND SEXUAL ABUSE IN CHILDHOOD: ICD-10-CM

## 2022-01-22 DIAGNOSIS — Z91.410 PERSONAL HISTORY OF ADULT PHYSICAL AND SEXUAL ABUSE: ICD-10-CM

## 2022-01-22 DIAGNOSIS — R00.1 BRADYCARDIA, UNSPECIFIED: ICD-10-CM

## 2022-01-22 DIAGNOSIS — E87.5 HYPERKALEMIA: ICD-10-CM

## 2022-01-22 DIAGNOSIS — F41.9 ANXIETY DISORDER, UNSPECIFIED: ICD-10-CM
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
## 2022-01-22 DIAGNOSIS — R41.82 ALTERED MENTAL STATUS, UNSPECIFIED: ICD-10-CM
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
## 2022-01-22 DIAGNOSIS — Z88.0 ALLERGY STATUS TO PENICILLIN: ICD-10-CM

## 2022-01-22 DIAGNOSIS — G40.909 EPILEPSY, UNSPECIFIED, NOT INTRACTABLE, WITHOUT STATUS EPILEPTICUS: ICD-10-CM

## 2022-01-22 DIAGNOSIS — R94.31 ABNORMAL ELECTROCARDIOGRAM [ECG] [EKG]: ICD-10-CM

## 2022-01-22 DIAGNOSIS — E87.2 ACIDOSIS: ICD-10-CM

## 2022-01-22 DIAGNOSIS — I44.2 ATRIOVENTRICULAR BLOCK, COMPLETE: ICD-10-CM

## 2022-01-22 DIAGNOSIS — R06.82 TACHYPNEA, NOT ELSEWHERE CLASSIFIED: ICD-10-CM

## 2022-01-22 DIAGNOSIS — E66.01 MORBID (SEVERE) OBESITY DUE TO EXCESS CALORIES: ICD-10-CM

## 2022-01-22 DIAGNOSIS — F43.10 POST-TRAUMATIC STRESS DISORDER, UNSPECIFIED: ICD-10-CM

## 2022-01-22 DIAGNOSIS — T43.592A POISONING BY OTHER ANTIPSYCHOTICS AND NEUROLEPTICS, INTENTIONAL SELF-HARM, INITIAL ENCOUNTER: ICD-10-CM

## 2022-01-22 DIAGNOSIS — F20.9 SCHIZOPHRENIA, UNSPECIFIED: ICD-10-CM

## 2022-09-08 NOTE — ED PROVIDER NOTE - CHILD ABUSE FACILITY
[facility] Albendazole Counseling:  I discussed with the patient the risks of albendazole including but not limited to cytopenia, kidney damage, nausea/vomiting and severe allergy.  The patient understands that this medication is being used in an off-label manner.

## 2022-11-09 RX ORDER — POLYETHYLENE GLYCOL 3350 AND ELECTROLYTES WITH LEMON FLAVOR 236; 22.74; 6.74; 5.86; 2.97 G/4L; G/4L; G/4L; G/4L; G/4L
236 POWDER, FOR SOLUTION ORAL
Qty: 1 | Refills: 0 | Status: ACTIVE | COMMUNITY
Start: 2020-12-01 | End: 1900-01-01

## 2023-03-22 NOTE — ED PROVIDER NOTE - DISCHARGE DATE
H&P UPDATE::    I HAVE REVEIWED THE HISTORY & PHYSICAL (H&P COMPLETED IN THE LAST 30 DAYS) AND EXAMINED THE PATIENT:    ___x__   NO CHANGES HAVE OCCURRED IN THE PATIENT'S CONDITION SINCE THE H&P WAS COMPLETED       _____ THE FOLLOWING ARE THE CHANGES IN THE PATIENT'S CONDITION (SPECIFIED BELOW):    18-Apr-2019

## 2023-05-24 NOTE — ED PROVIDER NOTE - PHYSICAL EXAMINATION
Floor
CONSTITUTIONAL:  NAD, unresponsive to verbal stimuli  SKIN:  warm, dry, no diaphoresis  HEAD:  NCAT  EYES:  pupils 1mm  ENT:  MMM  NECK:  supple; non tender  CARD:  tachycardic, regular rhythm  RESP:  CTAB  ABD:  S/NT, no R/G  MSK:  no pedal edema  NEURO:  unresponsive, pupils 1mm b/l, EOM not intact, responsive to pain, nonverbal

## 2023-08-30 NOTE — ED BEHAVIORAL HEALTH ASSESSMENT NOTE - NS ED [facility] MED ROS CARDIOVASCULAR
Caller: Miesha Bettencourt    Relationship: Self    Best call back number: [phone]    What is the best time to reach you: ANY TIME    Who are you requesting to speak with (clinical staff, provider,  specific staff member): ELIECER LYLES    Do you know the name of the person who called: SELF    What was the call regarding: PATIENT WENT TO AN EYE APPOINTMENT ON 08/29/2023 AND HER BLOOD PRESSURE /110 AND THEY TOOK IT AT 5PM AND AGAIN AT 5:20 AND IT WAS THE SAME 200/110. PATIENT WENT TO [facility] ED AND AT 6PM IT /81 AND WHILE WAITING SHE ASKED FOR THEM TO TAKE IT AGAIN AND AT 7:23PM IT /76. AT THAT TIME PATIENT LEFT HOSPITAL AND CAME HOME. PATIENT WOULD LIKE TO KNOW IF HER MEDICATION NEEDS TO BE ADJUSTED. PLEASE ADVISE       No complaints

## 2023-12-19 NOTE — ED ADULT NURSE NOTE - NSFALLRSKPASTHIST_ED_ALL_ED
From: Pretty Devine  To: Eric Aguilera MD  Sent: 12/18/2023 7:06 PM EST  Subject: Refill     Can you please send a refill of pregabalin 150 mg 3 times a day to [facility] in [facility]   no

## 2024-05-13 NOTE — ASU DISCHARGE PLAN (ADULT/PEDIATRIC) - D. IF YOU HAD ANY TYPE OF SEDATION, YOU MAY EXPERIENCE LIGHTHEADEDNESS, DIZZINESS, OR SLEEPINESS FOLLOWING YOUR PROCEDURE. A RESPONSIBLE ADULT SHOULD STAY WITH YOU FOR AT LEAST 24 HOURS FOLLOWING YOUR PROCEDURE.
Called in regards to no-show. Pt states she thought the apt was for tomorrow, [facility]  
Statement Selected

## 2024-05-16 NOTE — ED ADULT NURSE NOTE - OBJECTIVE STATEMENT
She is now following with Dr. Martini. Still using metformin. She is now on jardiance 10mg as well.  She was also started on Zestril 5 mg for renal protection.  Her A1c has worsened. I counseled her on diet and exercise. She will continue medications as prescribed and work on lifestyle changes.  Foot exam in clinic today and it is normal.  She is on Lipitor 20 mg and her cholesterol is significantly improved compared to prior.  She is not up-to-date on her eye exam   pt took 30 pills zyprexa

## 2024-11-04 NOTE — BEHAVIORAL HEALTH ASSESSMENT NOTE - GAIT / STATION
Detail Level: Detailed
Quality 226: Preventive Care And Screening: Tobacco Use: Screening And Cessation Intervention: Patient screened for tobacco use and is an ex/non-smoker
Other

## 2025-01-13 NOTE — ED ADULT NURSE NOTE - NS PRO AD NO ADVANCE DIRECTIVE
Problem: Potential for Falls  Goal: Patient will remain free of falls  Description: INTERVENTIONS:  - Educate patient/family on patient safety including physical limitations  - Instruct patient to call for assistance with activity   - Consult OT/PT to assist with strengthening/mobility   - Keep Call bell within reach  - Keep bed low and locked with side rails adjusted as appropriate  - Keep care items and personal belongings within reach  - Initiate and maintain comfort rounds  - Make Fall Risk Sign visible to staff  - Offer Toileting every 2 Hours, in advance of need  - Initiate/Maintain bed.chair alarm as needed.  - Obtain necessary fall risk management equipment: non-slip socks  - Apply yellow socks and bracelet for high fall risk patients  - Consider moving patient to room near nurses station  Outcome: Adequate for Discharge     Problem: Prexisting or High Potential for Compromised Skin Integrity  Goal: Skin integrity is maintained or improved  Description: INTERVENTIONS:  - Identify patients at risk for skin breakdown  - Assess and monitor skin integrity  - Assess and monitor nutrition and hydration status  - Monitor labs   - Assess for incontinence   - Turn and reposition patient  - Assist with mobility/ambulation  - Relieve pressure over bony prominences  - Avoid friction and shearing  - Provide appropriate hygiene as needed including keeping skin clean and dry  - Evaluate need for skin moisturizer/barrier cream  - Collaborate with interdisciplinary team   - Patient/family teaching  - Consider wound care consult   Outcome: Adequate for Discharge     Problem: PAIN - ADULT  Goal: Verbalizes/displays adequate comfort level or baseline comfort level  Description: Interventions:  - Encourage patient to monitor pain and request assistance  - Assess pain using appropriate pain scale  - Administer analgesics based on type and severity of pain and evaluate response  - Implement non-pharmacological measures as  appropriate and evaluate response  - Consider cultural and social influences on pain and pain management  - Notify physician/advanced practitioner if interventions unsuccessful or patient reports new pain  Outcome: Adequate for Discharge     Problem: INFECTION - ADULT  Goal: Absence or prevention of progression during hospitalization  Description: INTERVENTIONS:  - Assess and monitor for signs and symptoms of infection  - Monitor lab/diagnostic results  - Monitor all insertion sites, i.e. indwelling lines, tubes, and drains  - Monitor endotracheal if appropriate and nasal secretions for changes in amount and color  - [place] appropriate cooling/warming therapies per order  - Administer medications as ordered  - Instruct and encourage patient and family to use good hand hygiene technique  - Identify and instruct in appropriate isolation precautions for identified infection/condition  Outcome: Adequate for Discharge  Goal: Absence of fever/infection during neutropenic period  Description: INTERVENTIONS:  - Monitor WBC    Outcome: Adequate for Discharge  Goal: Infection Control Precautions  Outcome: Adequate for Discharge     Problem: SAFETY ADULT  Goal: Patient will remain free of falls  Description: INTERVENTIONS:  - Educate patient/family on patient safety including physical limitations  - Instruct patient to call for assistance with activity   - Consult OT/PT to assist with strengthening/mobility   - Keep Call bell within reach  - Keep bed low and locked with side rails adjusted as appropriate  - Keep care items and personal belongings within reach  - Initiate and maintain comfort rounds  - Make Fall Risk Sign visible to staff  - Offer Toileting every 2 Hours, in advance of need  - Initiate/Maintain bed.chair alarm as needed.  - Obtain necessary fall risk management equipment: non-slip socks  - Apply yellow socks and bracelet for high fall risk patients  - Consider moving patient to room near nurses  station  Outcome: Adequate for Discharge  Goal: Maintain or return to baseline ADL function  Description: INTERVENTIONS:  -  Assess patient's ability to carry out ADLs; assess patient's baseline for ADL function and identify physical deficits which impact ability to perform ADLs (bathing, care of mouth/teeth, toileting, grooming, dressing, etc.)  - Assess/evaluate cause of self-care deficits   - Assess range of motion  - Assess patient's mobility; develop plan if impaired  - Assess patient's need for assistive devices and provide as appropriate  - Encourage maximum independence but intervene and supervise when necessary  - Involve family in performance of ADLs  - Assess for home care needs following discharge   - Consider OT consult to assist with ADL evaluation and planning for discharge  - Provide patient education as appropriate  Outcome: Adequate for Discharge  Goal: Maintains/Returns to pre admission functional level  Description: INTERVENTIONS:  - Perform AM-PAC 6 Click Basic Mobility/ Daily Activity assessment daily.  - Set and communicate daily mobility goal to care team and patient/family/caregiver.   - Collaborate with rehabilitation services on mobility goals if consulted  - Perform Range of Motion 3 times a day.  - Reposition patient every 2 hours.  - Ambulate patient 3 times a day  - Out of bed to chair 3 times a day for meals  - Out of bed for toileting  - Record patient progress and toleration of activity level   Outcome: Adequate for Discharge     Problem: DISCHARGE PLANNING  Goal: Discharge to home or other facility with appropriate resources  Description: INTERVENTIONS:  - Identify barriers to discharge w/patient and caregiver  - Arrange for needed discharge resources and transportation as appropriate  - Identify discharge learning needs (meds, wound care, etc.)  - Arrange for interpretive services to assist at discharge as needed  - Refer to Case Management Department for coordinating discharge  planning if the patient needs post-hospital services based on physician/advanced practitioner order or complex needs related to functional status, cognitive ability, or social support system  Outcome: Adequate for Discharge     Problem: Knowledge Deficit  Goal: Patient/family/caregiver demonstrates understanding of disease process, treatment plan, medications, and discharge instructions  Description: Complete learning assessment and assess knowledge base.  Interventions:  - Provide teaching at level of understanding  - Provide teaching via preferred learning methods  Outcome: Adequate for Discharge     Problem: RESPIRATORY - ADULT  Goal: Achieves optimal ventilation and oxygenation  Description: INTERVENTIONS:  - Assess for changes in respiratory status  - Assess for changes in mentation and behavior  - Position to facilitate oxygenation and minimize respiratory effort  - Oxygen administered by appropriate delivery if ordered  - Initiate smoking cessation education as indicated  - Encourage broncho-pulmonary hygiene including cough, deep breathe, Incentive Spirometry  - Assess the need for suctioning and aspirate as needed  - Assess and instruct to report SOB or any respiratory difficulty  - Respiratory Therapy support as indicated  Outcome: Adequate for Discharge     Problem: METABOLIC, FLUID AND ELECTROLYTES - ADULT  Goal: Electrolytes maintained within normal limits  Description: INTERVENTIONS:  - Monitor labs and assess patient for signs and symptoms of electrolyte imbalances  - Administer electrolyte replacement as ordered  - Monitor response to electrolyte replacements, including repeat lab results as appropriate  - Instruct patient on fluid and nutrition as appropriate  Outcome: Adequate for Discharge  Goal: Fluid balance maintained  Description: INTERVENTIONS:  - Monitor labs   - Monitor I/O and WT  - Instruct patient on fluid and nutrition as appropriate  - Assess for signs & symptoms of volume excess or  deficit  Outcome: Adequate for Discharge     Problem: SKIN/TISSUE INTEGRITY - ADULT  Goal: Incision(s), wounds(s) or drain site(s) healing without S/S of infection  Description: INTERVENTIONS  - Assess and document dressing, incision, wound bed, drain sites and surrounding tissue  - Provide patient and family education  - Perform skin care/dressing changes per wound care orders and as needed for soilage  Outcome: Adequate for Discharge      No